# Patient Record
Sex: FEMALE | Race: BLACK OR AFRICAN AMERICAN | Employment: FULL TIME | URBAN - METROPOLITAN AREA
[De-identification: names, ages, dates, MRNs, and addresses within clinical notes are randomized per-mention and may not be internally consistent; named-entity substitution may affect disease eponyms.]

---

## 2020-05-19 ENCOUNTER — HOSPITAL ENCOUNTER (EMERGENCY)
Age: 25
Discharge: HOME | End: 2020-05-19
Attending: EMERGENCY MEDICINE
Payer: COMMERCIAL

## 2020-05-19 VITALS
SYSTOLIC BLOOD PRESSURE: 101 MMHG | OXYGEN SATURATION: 100 % | WEIGHT: 140 LBS | TEMPERATURE: 98.9 F | HEIGHT: 68 IN | DIASTOLIC BLOOD PRESSURE: 57 MMHG | HEART RATE: 58 BPM | RESPIRATION RATE: 17 BRPM | BODY MASS INDEX: 21.22 KG/M2

## 2020-05-19 LAB
ANION GAP SERPL CALCULATED.3IONS-SCNC: 9 MMOL/L (ref 3–16)
BACTERIA: ABNORMAL /HPF
BILIRUBIN URINE: NEGATIVE
BLOOD, URINE: NEGATIVE
BUN BLDV-MCNC: 12 MG/DL (ref 7–20)
CALCIUM SERPL-MCNC: 9.7 MG/DL (ref 8.3–10.6)
CHLORIDE BLD-SCNC: 101 MMOL/L (ref 99–110)
CLARITY: CLEAR
CO2: 29 MMOL/L (ref 21–32)
COLOR: YELLOW
CREAT SERPL-MCNC: 0.8 MG/DL (ref 0.6–1.1)
EKG ATRIAL RATE: 66 BPM
EKG DIAGNOSIS: NORMAL
EKG P AXIS: 75 DEGREES
EKG P-R INTERVAL: 156 MS
EKG Q-T INTERVAL: 388 MS
EKG QRS DURATION: 78 MS
EKG QTC CALCULATION (BAZETT): 406 MS
EKG R AXIS: 69 DEGREES
EKG T AXIS: 36 DEGREES
EKG VENTRICULAR RATE: 66 BPM
EPITHELIAL CELLS, UA: ABNORMAL /HPF (ref 0–5)
GFR AFRICAN AMERICAN: >60
GFR NON-AFRICAN AMERICAN: >60
GLUCOSE BLD-MCNC: 91 MG/DL
GLUCOSE BLD-MCNC: 91 MG/DL (ref 70–99)
GLUCOSE BLD-MCNC: 94 MG/DL (ref 70–99)
GLUCOSE URINE: NEGATIVE MG/DL
HCG(URINE) PREGNANCY TEST: NEGATIVE
KETONES, URINE: NEGATIVE MG/DL
LEUKOCYTE ESTERASE, URINE: ABNORMAL
MAGNESIUM: 1.9 MG/DL (ref 1.8–2.4)
MICROSCOPIC EXAMINATION: YES
NITRITE, URINE: NEGATIVE
PERFORMED ON: NORMAL
PH UA: 7 (ref 5–8)
POTASSIUM REFLEX MAGNESIUM: 3.5 MMOL/L (ref 3.5–5.1)
PROTEIN UA: NEGATIVE MG/DL
RBC UA: ABNORMAL /HPF (ref 0–4)
SODIUM BLD-SCNC: 139 MMOL/L (ref 136–145)
SPECIFIC GRAVITY UA: 1.02 (ref 1–1.03)
URINE TYPE: ABNORMAL
UROBILINOGEN, URINE: 1 E.U./DL
WBC UA: ABNORMAL /HPF (ref 0–5)

## 2020-05-19 PROCEDURE — 80048 BASIC METABOLIC PNL TOTAL CA: CPT

## 2020-05-19 PROCEDURE — 83735 ASSAY OF MAGNESIUM: CPT

## 2020-05-19 PROCEDURE — 93005 ELECTROCARDIOGRAM TRACING: CPT | Performed by: EMERGENCY MEDICINE

## 2020-05-19 PROCEDURE — 84703 CHORIONIC GONADOTROPIN ASSAY: CPT

## 2020-05-19 PROCEDURE — 99284 EMERGENCY DEPT VISIT MOD MDM: CPT

## 2020-05-19 PROCEDURE — 81001 URINALYSIS AUTO W/SCOPE: CPT

## 2020-05-19 ASSESSMENT — PAIN DESCRIPTION - LOCATION: LOCATION: ABDOMEN

## 2020-05-19 ASSESSMENT — PAIN DESCRIPTION - DESCRIPTORS: DESCRIPTORS: ACHING

## 2020-05-19 ASSESSMENT — PAIN DESCRIPTION - ORIENTATION: ORIENTATION: LEFT

## 2020-05-19 ASSESSMENT — PAIN DESCRIPTION - PROGRESSION: CLINICAL_PROGRESSION: GRADUALLY WORSENING

## 2020-05-19 ASSESSMENT — PAIN SCALES - GENERAL: PAINLEVEL_OUTOF10: 6

## 2020-05-19 ASSESSMENT — PAIN DESCRIPTION - ONSET: ONSET: ON-GOING

## 2020-05-19 ASSESSMENT — PAIN DESCRIPTION - PAIN TYPE: TYPE: ACUTE PAIN

## 2020-05-19 ASSESSMENT — PAIN DESCRIPTION - FREQUENCY: FREQUENCY: INTERMITTENT

## 2020-05-19 NOTE — ED NOTES
Pt DC from ED ambulatory. Reviewed discharge instructions. All questions were answered to patient satisfaction.  ERMIAS Barrow RN  05/19/20 [id number]

## 2020-05-19 NOTE — ED PROVIDER NOTES
[address] [facility] RESIDENT NOTE       Date of evaluation: 5/19/2020    Chief Complaint     Loss of Consciousness (passed out in the bathroom this morning. Hit her head and left shoulder) and Nausea      History of Present Illness     Hoa Sabillon is a 23 y.o. female who presents to the emergency department after a syncopal episode at home. She was brushing her teeth in the bathroom when she stood up quickly, experienced tunnel vision, flushing, nausea, and then passed out onto the tile floor. She believes that she hit her head in the left side of her body against the floor as she is now experiencing pain in those areas. She denies preceding chest pain, palpitations, shortness of breath. She also denies having bitten her tongue or having lost control of her bladder. Over the course of the last several days the patient has had an intermittent headache which she describes as similar to previous headaches that she has had from time to time but says that she has been staying adequately hydrated and has no changes in her appetite or diet. Review of Systems     Review of Systems    10-point review of systems completed and negative other than those items listed in the HPI above. Past Medical, Surgical, Family, and Social History     She has a past medical history of Anemia and Headache(784.0). She has a past surgical history that includes Tonsillectomy. Her family history is not on file. She reports that she has never smoked. She has never used smokeless tobacco. She reports that she does not drink alcohol or use drugs.     Medications     Discharge Medication List as of 5/19/2020  4:58 PM      CONTINUE these medications which have NOT CHANGED    Details   Prenatal MV-Min-Fe Fum-FA-DHA (PRENATAL 1 PO) Take by mouth dailyHistorical Med      acetaminophen (APAP EXTRA STRENGTH) 500 MG tablet Take 1 tablet by mouth every 6 hours as needed for Pain, Disp-120 tablet, inversion in lead III and lead V1. No ST segment changes. No evidence of acute ischemia, dysrhythmia, or strain. No prior EKGs available for comparison.     RADIOLOGY:  No orders to display       LABS:   Results for orders placed or performed during the hospital encounter of 05/19/20   Pregnancy, Urine   Result Value Ref Range    HCG(Urine) Pregnancy Test Negative Detects HCG level >20 MIU/mL   Urinalysis, reflex to microscopic   Result Value Ref Range    Color, UA Yellow Straw/Yellow    Clarity, UA Clear Clear    Glucose, Ur Negative Negative mg/dL    Bilirubin Urine Negative Negative    Ketones, Urine Negative Negative mg/dL    Specific Gravity, UA 1.020 1.005 - 1.030    Blood, Urine Negative Negative    pH, UA 7.0 5.0 - 8.0    Protein, UA Negative Negative mg/dL    Urobilinogen, Urine 1.0 <2.0 E.U./dL    Nitrite, Urine Negative Negative    Leukocyte Esterase, Urine TRACE (A) Negative    Microscopic Examination YES     Urine Type NotGiven    Basic Metabolic Panel w/ Reflex to MG   Result Value Ref Range    Sodium 139 136 - 145 mmol/L    Potassium reflex Magnesium 3.5 3.5 - 5.1 mmol/L    Chloride 101 99 - 110 mmol/L    CO2 29 21 - 32 mmol/L    Anion Gap 9 3 - 16    Glucose 94 70 - 99 mg/dL    BUN 12 7 - 20 mg/dL    CREATININE 0.8 0.6 - 1.1 mg/dL    GFR Non-African American >60 >60    GFR African American >60 >60    Calcium 9.7 8.3 - 10.6 mg/dL   Microscopic Urinalysis   Result Value Ref Range    WBC, UA 3-5 0 - 5 /HPF    RBC, UA None seen 0 - 4 /HPF    Epithelial Cells, UA 0-1 0 - 5 /HPF    Bacteria, UA 1+ (A) None Seen /HPF   Magnesium   Result Value Ref Range    Magnesium 1.90 1.80 - 2.40 mg/dL   POC Glucose   Result Value Ref Range    Glucose 91 mg/dL   POCT Glucose   Result Value Ref Range    POC Glucose 91 70 - 99 mg/dl    Performed on [employer]    EKG 12 Lead   Result Value Ref Range    Ventricular Rate 66 BPM    Atrial Rate 66 BPM    P-R Interval 156 ms    QRS Duration 78 ms    Q-T Interval 388 ms    QTc Calculation (Bazett) 406 ms    P Axis 75 degrees    R Axis 69 degrees    T Axis 36 degrees    Diagnosis       EKG performed in ER and to be interpreted by ER physician. Confirmed by MD, ER ([id number]),  Edmundo Fish ([phone]) on 5/19/2020 5:36:22 PM       ED BEDSIDE ULTRASOUND:  None    RECENT VITALS:  BP: (!) 101/57, Temp: 98.9 °F (37.2 °C), Pulse: 58,Resp: 17, SpO2: 100 %     Procedures     None    ED Course     Nursing Notes, Past Medical Hx, Past Surgical Hx, Social Hx, Allergies, and Family Hx were reviewed. The patient was given the followingmedications:  No orders of the defined types were placed in this encounter. CONSULTS:  None    MEDICAL DECISION MAKING / ASSESSMENT / PLAN     Hoa Sabillon is a 23 y.o. female who is otherwise previously healthy presenting after an episode of syncope at home. Based on our evaluation, this is a patient that is presenting with low risk syncope. High risk factors such as prior coronary artery disease, congestive heart failure, advanced age, chest pain or palpitations are all absent. There was no history or exam findings to suggest a neurologic cause of syncope. Additionally, electrolytes, kidney function, and CBC were within normal limits and the patient had no historical points concerning for GI bleeding. Pregnancy test was negative. No fever or nidus of infection apparent on exam as well. EKG was within normal limits. PE was considered but is less likely given the clinical picture and current vital signs. There is also no evidence of serious trauma resulting from the fall that would requiring further workup. The patient's story correlates best with orthostasis / vasovagal syncope, and given the low risk nature of the presentation, patient will be discharged home with appropriate follow up. This patient was also evaluated by the attending physician. All care plans werediscussed and agreed upon. Clinical Impression     1.  Syncope and collapse

## 2020-06-22 ENCOUNTER — OFFICE VISIT (OUTPATIENT)
Dept: PRIMARY CARE CLINIC | Age: 25
End: 2020-06-22
Payer: COMMERCIAL

## 2020-06-22 VITALS — TEMPERATURE: 97.7 F

## 2020-06-22 PROCEDURE — G8428 CUR MEDS NOT DOCUMENT: HCPCS | Performed by: INTERNAL MEDICINE

## 2020-06-22 PROCEDURE — 1036F TOBACCO NON-USER: CPT | Performed by: INTERNAL MEDICINE

## 2020-06-22 PROCEDURE — 99211 OFF/OP EST MAY X REQ PHY/QHP: CPT | Performed by: INTERNAL MEDICINE

## 2020-06-22 PROCEDURE — G8420 CALC BMI NORM PARAMETERS: HCPCS | Performed by: INTERNAL MEDICINE

## 2020-06-22 NOTE — PATIENT INSTRUCTIONS
have a medical emergency and need to call 911, notify the dispatch personnel that you have, or are being evaluated for COVID-19. If possible, put on a facemask before emergency medical services arrive. Discontinuing home isolation  Patients with confirmed COVID-19 should remain under home isolation precautions until the risk of secondary transmission to others is thought to be low. The decision to discontinue home isolation precautions should be made on a case-by-case basis, in consultation with healthcare providers and state and local health departments. Thank you for enrolling in [address] Please follow the instructions below to securely access your online medical record. [employer] allows you to send messages to your doctor, view your test results, renew your prescriptions, schedule appointments, and more. How Do I Sign Up? 1. In your Internet browser, go to [email or web address]  2. Click on the Sign Up Now link in the Sign In box. You will see the New Member Sign Up page. 3. Enter your [employer] Access Code exactly as it appears below. You will not need to use this code after youve completed the sign-up process. If you do not sign up before the expiration date, you must request a new code. [employer] Access Code: [email or web address]  Expires: 8/6/2020  9:57 AM    4. Enter your Social Security Number ([id number]) and Date of Birth (mm/dd/yyyy) as indicated and click Submit. You will be taken to the next sign-up page. 5. Create a [facility] ID. This will be your [facility] login ID and cannot be changed, so think of one that is secure and easy to remember. 6. Create a [employer] password. You can change your password at any time. 7. Enter your Password Reset Question and Answer. This can be used at a later time if you forget your password. 8. Enter your e-mail address. You will receive e-mail notification when new information is available in [address] 9. Click Sign Up.  You can now view your

## 2020-06-23 LAB
SARS-COV-2: NOT DETECTED
SOURCE: NORMAL

## 2020-06-23 NOTE — RESULT ENCOUNTER NOTE
Please contact patient with their testing results: Your test for COVID-19, also known as novel coronavirus, came back negative. No virus was detected from the sample collected. Until your symptoms are fully resolved, you may still be contagious. We recommend that you remain isolated for 7 days minimum or 72 hours after your symptoms have completely resolved, whichever is longer. Continually monitor symptoms. Contact a medical provider if symptoms are worsening. If you have any additional questions, contact your PCP.     For additional information, please visit the Centers for Disease Control and Prevention   [email or web address]

## 2021-03-13 ENCOUNTER — HOSPITAL ENCOUNTER (EMERGENCY)
Age: 26
Discharge: HOME | End: 2021-03-13
Attending: EMERGENCY MEDICINE
Payer: COMMERCIAL

## 2021-03-13 VITALS
RESPIRATION RATE: 18 BRPM | BODY MASS INDEX: 22.73 KG/M2 | HEART RATE: 67 BPM | TEMPERATURE: 99.1 F | OXYGEN SATURATION: 99 % | WEIGHT: 150 LBS | DIASTOLIC BLOOD PRESSURE: 87 MMHG | HEIGHT: 68 IN | SYSTOLIC BLOOD PRESSURE: 133 MMHG

## 2021-03-13 DIAGNOSIS — N93.9 VAGINAL BLEEDING: Primary | ICD-10-CM

## 2021-03-13 LAB
ABO/RH: NORMAL
ANION GAP SERPL CALCULATED.3IONS-SCNC: 6 MMOL/L (ref 3–16)
ANTIBODY SCREEN: NORMAL
BACTERIA: ABNORMAL /HPF
BASOPHILS ABSOLUTE: 0 K/UL (ref 0–0.2)
BASOPHILS RELATIVE PERCENT: 0.3 %
BILIRUBIN URINE: NEGATIVE
BLOOD, URINE: ABNORMAL
BUN BLDV-MCNC: 9 MG/DL (ref 7–20)
CALCIUM SERPL-MCNC: 9.2 MG/DL (ref 8.3–10.6)
CHLORIDE BLD-SCNC: 104 MMOL/L (ref 99–110)
CLARITY: CLEAR
CO2: 28 MMOL/L (ref 21–32)
COLOR: YELLOW
CREAT SERPL-MCNC: 0.8 MG/DL (ref 0.6–1.1)
EOSINOPHILS ABSOLUTE: 0.1 K/UL (ref 0–0.6)
EOSINOPHILS RELATIVE PERCENT: 1.6 %
EPITHELIAL CELLS, UA: ABNORMAL /HPF (ref 0–5)
GFR AFRICAN AMERICAN: >60
GFR NON-AFRICAN AMERICAN: >60
GLUCOSE BLD-MCNC: 67 MG/DL (ref 70–99)
GLUCOSE URINE: NEGATIVE MG/DL
GONADOTROPIN, CHORIONIC (HCG) QUANT: 332.1 MIU/ML
HCT VFR BLD CALC: 36 % (ref 36–48)
HEMOGLOBIN: 11.6 G/DL (ref 12–16)
KETONES, URINE: NEGATIVE MG/DL
LEUKOCYTE ESTERASE, URINE: NEGATIVE
LYMPHOCYTES ABSOLUTE: 2.1 K/UL (ref 1–5.1)
LYMPHOCYTES RELATIVE PERCENT: 43 %
MCH RBC QN AUTO: 29 PG (ref 26–34)
MCHC RBC AUTO-ENTMCNC: 32.1 G/DL (ref 31–36)
MCV RBC AUTO: 90.2 FL (ref 80–100)
MICROSCOPIC EXAMINATION: YES
MONOCYTES ABSOLUTE: 0.3 K/UL (ref 0–1.3)
MONOCYTES RELATIVE PERCENT: 6.3 %
NEUTROPHILS ABSOLUTE: 2.4 K/UL (ref 1.7–7.7)
NEUTROPHILS RELATIVE PERCENT: 48.8 %
NITRITE, URINE: NEGATIVE
PDW BLD-RTO: 13.4 % (ref 12.4–15.4)
PH UA: 6.5 (ref 5–8)
PLATELET # BLD: 186 K/UL (ref 135–450)
PMV BLD AUTO: 9.2 FL (ref 5–10.5)
POTASSIUM REFLEX MAGNESIUM: 4 MMOL/L (ref 3.5–5.1)
PROTEIN UA: NEGATIVE MG/DL
RBC # BLD: 3.99 M/UL (ref 4–5.2)
RBC UA: ABNORMAL /HPF (ref 0–4)
SODIUM BLD-SCNC: 138 MMOL/L (ref 136–145)
SPECIFIC GRAVITY UA: 1.02 (ref 1–1.03)
URINE REFLEX TO CULTURE: ABNORMAL
URINE TYPE: ABNORMAL
UROBILINOGEN, URINE: 0.2 E.U./DL
WBC # BLD: 4.8 K/UL (ref 4–11)
WBC UA: ABNORMAL /HPF (ref 0–5)

## 2021-03-13 PROCEDURE — 99203 OFFICE O/P NEW LOW 30 MIN: CPT | Performed by: OBSTETRICS & GYNECOLOGY

## 2021-03-13 PROCEDURE — 80048 BASIC METABOLIC PNL TOTAL CA: CPT

## 2021-03-13 PROCEDURE — 86901 BLOOD TYPING SEROLOGIC RH(D): CPT

## 2021-03-13 PROCEDURE — 96372 THER/PROPH/DIAG INJ SC/IM: CPT

## 2021-03-13 PROCEDURE — 99285 EMERGENCY DEPT VISIT HI MDM: CPT

## 2021-03-13 PROCEDURE — 36415 COLL VENOUS BLD VENIPUNCTURE: CPT

## 2021-03-13 PROCEDURE — 6360000002 HC RX W HCPCS: Performed by: STUDENT IN AN ORGANIZED HEALTH CARE EDUCATION/TRAINING PROGRAM

## 2021-03-13 PROCEDURE — 2580000003 HC RX 258: Performed by: STUDENT IN AN ORGANIZED HEALTH CARE EDUCATION/TRAINING PROGRAM

## 2021-03-13 PROCEDURE — 86900 BLOOD TYPING SEROLOGIC ABO: CPT

## 2021-03-13 PROCEDURE — 85025 COMPLETE CBC W/AUTO DIFF WBC: CPT

## 2021-03-13 PROCEDURE — 86850 RBC ANTIBODY SCREEN: CPT

## 2021-03-13 PROCEDURE — 96365 THER/PROPH/DIAG IV INF INIT: CPT

## 2021-03-13 PROCEDURE — 84702 CHORIONIC GONADOTROPIN TEST: CPT

## 2021-03-13 PROCEDURE — 6370000000 HC RX 637 (ALT 250 FOR IP): Performed by: STUDENT IN AN ORGANIZED HEALTH CARE EDUCATION/TRAINING PROGRAM

## 2021-03-13 PROCEDURE — 81001 URINALYSIS AUTO W/SCOPE: CPT

## 2021-03-13 PROCEDURE — 96366 THER/PROPH/DIAG IV INF ADDON: CPT

## 2021-03-13 RX ORDER — METHYLERGONOVINE MALEATE 0.2 MG/ML
200 INJECTION INTRAVENOUS ONCE
Status: COMPLETED | OUTPATIENT
Start: 2021-03-13 | End: 2021-03-13

## 2021-03-13 RX ORDER — HYDROCODONE BITARTRATE AND ACETAMINOPHEN 5; 325 MG/1; MG/1
1 TABLET ORAL EVERY 6 HOURS PRN
Qty: 5 TABLET | Refills: 0 | Status: SHIPPED | OUTPATIENT
Start: 2021-03-13 | End: 2021-03-16

## 2021-03-13 RX ORDER — ACETAMINOPHEN 325 MG/1
650 TABLET ORAL ONCE
Status: COMPLETED | OUTPATIENT
Start: 2021-03-13 | End: 2021-03-13

## 2021-03-13 RX ORDER — METHYLERGONOVINE MALEATE 0.2 MG/1
0.2 TABLET ORAL EVERY 6 HOURS
Qty: 8 TABLET | Refills: 0 | Status: SHIPPED | OUTPATIENT
Start: 2021-03-13 | End: 2021-03-15

## 2021-03-13 RX ORDER — DOXYCYCLINE 100 MG/1
100 TABLET ORAL 2 TIMES DAILY
Qty: 14 TABLET | Refills: 0 | Status: SHIPPED | OUTPATIENT
Start: 2021-03-13 | End: 2021-03-20

## 2021-03-13 RX ORDER — IBUPROFEN 600 MG/1
600 TABLET ORAL EVERY 6 HOURS
Qty: 30 TABLET | Refills: 0 | Status: SHIPPED | OUTPATIENT
Start: 2021-03-13

## 2021-03-13 RX ADMIN — ACETAMINOPHEN 650 MG: 325 TABLET ORAL at 11:13

## 2021-03-13 RX ADMIN — OXYTOCIN: 10 INJECTION, SOLUTION INTRAMUSCULAR; INTRAVENOUS at 11:52

## 2021-03-13 RX ADMIN — METHYLERGONOVINE MALEATE 200 MCG: 0.2 INJECTION INTRAVENOUS at 11:47

## 2021-03-13 ASSESSMENT — ENCOUNTER SYMPTOMS
NAUSEA: 0
VOMITING: 0
SORE THROAT: 0
ABDOMINAL PAIN: 1
SHORTNESS OF BREATH: 0

## 2021-03-13 ASSESSMENT — PAIN DESCRIPTION - ORIENTATION: ORIENTATION: LOWER

## 2021-03-13 ASSESSMENT — PAIN SCALES - GENERAL
PAINLEVEL_OUTOF10: 8
PAINLEVEL_OUTOF10: 8

## 2021-03-13 ASSESSMENT — PAIN DESCRIPTION - PAIN TYPE: TYPE: ACUTE PAIN

## 2021-03-13 ASSESSMENT — PAIN DESCRIPTION - DESCRIPTORS: DESCRIPTORS: CRAMPING

## 2021-03-13 ASSESSMENT — PAIN DESCRIPTION - LOCATION: LOCATION: ABDOMEN

## 2021-03-13 NOTE — ED TRIAGE NOTES
Had elective  on Thursday at 26 weeks.  States has been passing large blood clots (the size of my hand) since that time with associated abdominal pain

## 2021-03-13 NOTE — ED PROVIDER NOTES
[address] [facility] RESIDENT NOTE       Date of evaluation: 3/13/2021    Chief Complaint     Vaginal Bleeding (Had elective  on Thursday at 29 weeks. States has been passing large blood clots (the size of my hand) since that time with associated abdominal pain)      History of Present Illness     Dallas Moy is a 23 y.o. female [id number] who presents with vaginal bleeding. Patient had termination of her pregnancy 2 days ago at [address] [place] [address]. She had minimal vaginal bleeding for the first 24 hours, but starting yesterday began passing large clots. Now, she is soaking through 3 pads an hour along with her large clots. She has back pain and lower abdominal pain, cramping and sharp, without radiation. This has been progressively worsening over the past 24 hours as well. Review of Systems     Review of Systems   Constitutional: Negative for fever. HENT: Negative for congestion and sore throat. Respiratory: Negative for shortness of breath. Cardiovascular: Negative for chest pain. Gastrointestinal: Positive for abdominal pain. Negative for nausea and vomiting. Genitourinary: Positive for pelvic pain and vaginal bleeding. Negative for difficulty urinating and vaginal discharge. Neurological: Negative for dizziness and weakness. Hematological: Does not bruise/bleed easily. Psychiatric/Behavioral: Negative for confusion. All other systems reviewed and are negative. Past Medical, Surgical, Family, and Social History     She has a past medical history of Anemia and Headache(784.0). She has a past surgical history that includes Tonsillectomy and Induced  (2021). Her family history is not on file. She reports that she has never smoked. She has never used smokeless tobacco. She reports that she does not drink alcohol or use drugs.     Medications     Previous Medications    No medications on file       Allergies     She has No Known Allergies. Physical Exam     INITIAL VITALS: BP: 133/87, Temp: 99.1 °F (37.3 °C), Pulse: 67, Resp: 18, SpO2: 99 %   Physical Exam  Exam conducted with a chaperone present. Constitutional:       General: She is not in acute distress. HENT:      Head: Normocephalic and atraumatic. Right Ear: External ear normal.      Left Ear: External ear normal.      Nose: Nose normal.      Mouth/Throat:      Mouth: Mucous membranes are moist.      Pharynx: Oropharynx is clear. Eyes:      Extraocular Movements: Extraocular movements intact. Conjunctiva/sclera: Conjunctivae normal.   Neck:      Musculoskeletal: Normal range of motion and neck supple. Cardiovascular:      Rate and Rhythm: Normal rate and regular rhythm. Pulses: Normal pulses. Heart sounds: Normal heart sounds. No murmur. Pulmonary:      Effort: Pulmonary effort is normal. No respiratory distress. Breath sounds: Normal breath sounds. Abdominal:      General: Abdomen is flat. There is no distension. Palpations: Abdomen is soft. Tenderness: There is no abdominal tenderness. There is no right CVA tenderness or left CVA tenderness. Genitourinary:     General: Normal vulva. Exam position: Lithotomy position. Vagina: Bleeding present. No tenderness or lesions. Cervix: Dilated. Cervical bleeding present. No cervical motion tenderness, discharge, lesion or erythema. Comments: Clitoral piercing in place. Musculoskeletal:         General: No deformity. Skin:     General: Skin is warm and dry. Capillary Refill: Capillary refill takes less than 2 seconds. Findings: No bruising. Neurological:      General: No focal deficit present. Mental Status: She is alert and oriented to person, place, and time.    Psychiatric:         Mood and Affect: Mood normal.         Behavior: Behavior normal.         DiagnosticResults     EKG   None    RADIOLOGY:  No orders to display       LABS:   Results for orders placed or performed during the hospital encounter of 03/13/21   Urinalysis Reflex to Culture    Specimen: Urine, clean catch   Result Value Ref Range    Color, UA Yellow Straw/Yellow    Clarity, UA Clear Clear    Glucose, Ur Negative Negative mg/dL    Bilirubin Urine Negative Negative    Ketones, Urine Negative Negative mg/dL    Specific Gravity, UA 1.020 1.005 - 1.030    Blood, Urine MODERATE (A) Negative    pH, UA 6.5 5.0 - 8.0    Protein, UA Negative Negative mg/dL    Urobilinogen, Urine 0.2 <2.0 E.U./dL    Nitrite, Urine Negative Negative    Leukocyte Esterase, Urine Negative Negative    Microscopic Examination YES     Urine Type NotGiven     Urine Reflex to Culture Not Indicated    Basic Metabolic Panel w/ Reflex to MG   Result Value Ref Range    Sodium 138 136 - 145 mmol/L    Potassium reflex Magnesium 4.0 3.5 - 5.1 mmol/L    Chloride 104 99 - 110 mmol/L    CO2 28 21 - 32 mmol/L    Anion Gap 6 3 - 16    Glucose 67 (L) 70 - 99 mg/dL    BUN 9 7 - 20 mg/dL    CREATININE 0.8 0.6 - 1.1 mg/dL    GFR Non-African American >60 >60    GFR African American >60 >60    Calcium 9.2 8.3 - 10.6 mg/dL   CBC Auto Differential   Result Value Ref Range    WBC 4.8 4.0 - 11.0 K/uL    RBC 3.99 (L) 4.00 - 5.20 M/uL    Hemoglobin 11.6 (L) 12.0 - 16.0 g/dL    Hematocrit 36.0 36.0 - 48.0 %    MCV 90.2 80.0 - 100.0 fL    MCH 29.0 26.0 - 34.0 pg    MCHC 32.1 31.0 - 36.0 g/dL    RDW 13.4 12.4 - 15.4 %    Platelets 422 931 - 055 K/uL    MPV 9.2 5.0 - 10.5 fL    Neutrophils % 48.8 %    Lymphocytes % 43.0 %    Monocytes % 6.3 %    Eosinophils % 1.6 %    Basophils % 0.3 %    Neutrophils Absolute 2.4 1.7 - 7.7 K/uL    Lymphocytes Absolute 2.1 1.0 - 5.1 K/uL    Monocytes Absolute 0.3 0.0 - 1.3 K/uL    Eosinophils Absolute 0.1 0.0 - 0.6 K/uL    Basophils Absolute 0.0 0.0 - 0.2 K/uL   HCG, Quantitative, Pregnancy   Result Value Ref Range    hCG Quant 332.1 <5.0 mIU/mL   Microscopic Urinalysis   Result Value Ref Range    WBC, UA 0-2 0 - 5 /HPF    RBC, UA 5-10 (A) 0 - 4 /HPF    Epithelial Cells, UA 11-20 (A) 0 - 5 /HPF    Bacteria, UA Rare (A) None Seen /HPF   TYPE AND SCREEN   Result Value Ref Range    ABO/Rh B POS     Antibody Screen NEG        ED BEDSIDE ULTRASOUND:  FAST exam - no free fluid    RECENT VITALS:  BP: 133/87, Temp: 99.1 °F (37.3 °C), Pulse: 67,Resp: 18, SpO2: 99 %     Procedures     None    ED Course     Nursing Notes, Past Medical Hx, Past Surgical Hx, Social Hx, Allergies, and Family Hx were reviewed. The patient was given the followingmedications:  Orders Placed This Encounter   Medications    acetaminophen (TYLENOL) tablet 650 mg    methylergonovine (METHERGINE) injection 200 mcg    oxytocin 40 Units in sodium chloride 0.9 % 1,000 mL infusion    methylergonovine (METHERGINE) 0.2 MG tablet     Sig: Take 1 tablet by mouth every 6 hours for 2 days     Dispense:  8 tablet     Refill:  0    doxycycline monohydrate (ADOXA) 100 MG tablet     Sig: Take 1 tablet by mouth 2 times daily for 7 days     Dispense:  14 tablet     Refill:  0    ibuprofen (ADVIL;MOTRIN) 600 MG tablet     Sig: Take 1 tablet by mouth every 6 hours     Dispense:  30 tablet     Refill:  0    HYDROcodone-acetaminophen (NORCO) 5-325 MG per tablet     Sig: Take 1 tablet by mouth every 6 hours as needed for Pain for up to 3 days. Intended supply: 3 days. Take lowest dose possible to manage pain     Dispense:  5 tablet     Refill:  0       CONSULTS:  IP CONSULT TO OB GYN    MEDICAL DECISION MAKING / ASSESSMENT / Danyelle Ru is a 23 y.o. female presenting for vaginal bleeding after termination of pregnancy 2 days ago. Physical exam is concerning for uterine atony due to boggy uterus. Fast exam shows no free fluid. CBC shows hemoglobin 11.6, slightly down from baseline. BMP unremarkable. UA shows RBCs, with contamination. Type and screen sent. Patient is Rh+. Quantitative hCG 332. GYN consulted for concerns of uterine atony.   Recommend

## 2021-03-13 NOTE — PROGRESS NOTES
Patient is a 23year old F s/p suction d and e at 32 weeks in [address] [place] [address] overnight after procedure in hotel next door. Patient started bleeding this am heavier. Patient no fever, chills, does have some cramping. Vaginal Bleeding  This is a new problem. The current episode started yesterday. The problem occurs daily. The problem has been unchanged. Associated symptoms include abdominal pain. Pertinent negatives include no urinary symptoms. Nothing aggravates the symptoms. She has tried nothing for the symptoms. The treatment provided no relief.      Review of Systems: as above  General ROS: negative  Psychological ROS: negative  Ophthalmic ROS: negative  ENT ROS: negative  Allergy and Immunology ROS: negative  Hematological and Lymphatic ROS: negative  Endocrine ROS: negative  Breast ROS: negative  Respiratory ROS: negative  Cardiovascular ROS: negative  Gastrointestinal ROS: negative  Genito-Urinary ROS: as above  Musculoskeletal ROS: negative  Neurological ROS: negative  Dermatological ROS: negative    Date of Birth 1995  Past Medical History:   Diagnosis Date    Anemia     Headache(784.0)      Past Surgical History:   Procedure Laterality Date    INDUCED   2021    TONSILLECTOMY       OB History    Para Term  AB Living   2       1     SAB TAB Ectopic Molar Multiple Live Births             1      # Outcome Date GA Lbr Bill/2nd Weight Sex Delivery Anes PTL Lv   2       Vag-Vacuum      1 AB              Social History     Socioeconomic History    Marital status: Single     Spouse name: Not on file    Number of children: Not on file    Years of education: Not on file    Highest education level: Not on file   Occupational History    Not on file   Social Needs    Financial resource strain: Not on file    Food insecurity     Worry: Not on file     Inability: Not on file    Transportation needs     Medical: Not on file     Non-medical: Not on file   Tobacco Use    Smoking status: Never Smoker    Smokeless tobacco: Never Used   Substance and Sexual Activity    Alcohol use: No    Drug use: No    Sexual activity: Not on file   Lifestyle    Physical activity     Days per week: Not on file     Minutes per session: Not on file    Stress: Not on file   Relationships    Social connections     Talks on phone: Not on file     Gets together: Not on file     Attends Protestant service: Not on file     Active member of club or organization: Not on file     Attends meetings of clubs or organizations: Not on file     Relationship status: Not on file    Intimate partner violence     Fear of current or ex partner: Not on file     Emotionally abused: Not on file     Physically abused: Not on file     Forced sexual activity: Not on file   Other Topics Concern    Not on file   Social History Narrative    Not on file     No Known Allergies  Outpatient Medications Marked as Taking for the 3/13/21 encounter [facility] Encounter)   Medication Sig Dispense Refill    ibuprofen (ADVIL;MOTRIN) 600 MG tablet Take 1 tablet by mouth every 6 hours as needed for Pain 30 tablet 0     History reviewed. No pertinent family history. /87   Pulse 67   Temp 99.1 °F (37.3 °C) (Oral)   Resp 18   Ht 5' 8\" (1.727 m)   Wt 150 lb (68 kg)   LMP 03/11/2021 (Exact Date)   SpO2 99%   Breastfeeding No   BMI 22.81 kg/m²     WDWN in NAD  A and O x 3  HEENT-EOMI, mmm  CAR-RRR  Lungs-CTA  ABD-soft, NT, ND, no hsm  EXT-no c/c/e, no cords, NT  Neuro-grossly intact  PV-nl efg, Normal urethral meatus, nl urethra, nl bladder. ,cervix with no active bleeding. Uterus slightly enlarged 12 week size, cervix cl/th/high    Lab Results   Component Value Date    WBC 4.8 03/13/2021    HGB 11.6 (L) 03/13/2021    HCT 36.0 03/13/2021    MCV 90.2 03/13/2021     03/13/2021     Quant 331   RH pos    Plan-patient is s/p suction d and e at 26 weeks  1-uterine atony post procedure.  Was given Methergine and IV pit in ER. Bleeding improved. To go home on methergine, abx and pain medication. Pelvic rest for 4 weeks. Follow up with her OB/GYN at [facility]. Doubt retained products given lower quant.  Will have some bleeding for up to 4-6 weeks given gestational age at time of D and E.

## 2022-10-12 ENCOUNTER — HOSPITAL ENCOUNTER (EMERGENCY)
Age: 27
Discharge: HOME | End: 2022-10-12
Payer: COMMERCIAL

## 2022-10-12 VITALS
SYSTOLIC BLOOD PRESSURE: 91 MMHG | OXYGEN SATURATION: 98 % | HEART RATE: 70 BPM | RESPIRATION RATE: 16 BRPM | DIASTOLIC BLOOD PRESSURE: 55 MMHG | TEMPERATURE: 98.1 F | WEIGHT: 165.79 LBS | HEIGHT: 68 IN | BODY MASS INDEX: 25.13 KG/M2

## 2022-10-12 DIAGNOSIS — S09.90XA INJURY OF HEAD, INITIAL ENCOUNTER: Primary | ICD-10-CM

## 2022-10-12 PROCEDURE — 99284 EMERGENCY DEPT VISIT MOD MDM: CPT

## 2022-10-12 PROCEDURE — 2580000003 HC RX 258: Performed by: PHYSICIAN ASSISTANT

## 2022-10-12 PROCEDURE — 96374 THER/PROPH/DIAG INJ IV PUSH: CPT

## 2022-10-12 PROCEDURE — 6360000002 HC RX W HCPCS: Performed by: PHYSICIAN ASSISTANT

## 2022-10-12 PROCEDURE — 96375 TX/PRO/DX INJ NEW DRUG ADDON: CPT

## 2022-10-12 RX ORDER — KETOROLAC TROMETHAMINE 30 MG/ML
15 INJECTION, SOLUTION INTRAMUSCULAR; INTRAVENOUS ONCE
Status: COMPLETED | OUTPATIENT
Start: 2022-10-12 | End: 2022-10-12

## 2022-10-12 RX ORDER — SODIUM CHLORIDE, SODIUM LACTATE, POTASSIUM CHLORIDE, AND CALCIUM CHLORIDE .6; .31; .03; .02 G/100ML; G/100ML; G/100ML; G/100ML
1000 INJECTION, SOLUTION INTRAVENOUS ONCE
Status: COMPLETED | OUTPATIENT
Start: 2022-10-12 | End: 2022-10-12

## 2022-10-12 RX ORDER — METOCLOPRAMIDE HYDROCHLORIDE 5 MG/ML
10 INJECTION INTRAMUSCULAR; INTRAVENOUS ONCE
Status: COMPLETED | OUTPATIENT
Start: 2022-10-12 | End: 2022-10-12

## 2022-10-12 RX ORDER — DIPHENHYDRAMINE HYDROCHLORIDE 50 MG/ML
25 INJECTION INTRAMUSCULAR; INTRAVENOUS ONCE
Status: COMPLETED | OUTPATIENT
Start: 2022-10-12 | End: 2022-10-12

## 2022-10-12 RX ADMIN — SODIUM CHLORIDE, POTASSIUM CHLORIDE, SODIUM LACTATE AND CALCIUM CHLORIDE 1000 ML: 600; 310; 30; 20 INJECTION, SOLUTION INTRAVENOUS at 18:56

## 2022-10-12 RX ADMIN — KETOROLAC TROMETHAMINE 15 MG: 30 INJECTION, SOLUTION INTRAMUSCULAR at 18:55

## 2022-10-12 RX ADMIN — METOCLOPRAMIDE HYDROCHLORIDE 10 MG: 5 INJECTION INTRAMUSCULAR; INTRAVENOUS at 18:55

## 2022-10-12 RX ADMIN — DIPHENHYDRAMINE HYDROCHLORIDE 25 MG: 50 INJECTION, SOLUTION INTRAMUSCULAR; INTRAVENOUS at 18:55

## 2022-10-12 ASSESSMENT — ENCOUNTER SYMPTOMS
ABDOMINAL PAIN: 0
BACK PAIN: 0
SHORTNESS OF BREATH: 0

## 2022-10-12 ASSESSMENT — PAIN SCALES - GENERAL
PAINLEVEL_OUTOF10: 7
PAINLEVEL_OUTOF10: 6
PAINLEVEL_OUTOF10: 10

## 2022-10-12 ASSESSMENT — PAIN DESCRIPTION - PAIN TYPE: TYPE: ACUTE PAIN

## 2022-10-12 ASSESSMENT — PAIN DESCRIPTION - DESCRIPTORS: DESCRIPTORS: ACHING

## 2022-10-12 ASSESSMENT — PAIN DESCRIPTION - FREQUENCY: FREQUENCY: CONTINUOUS

## 2022-10-12 ASSESSMENT — PAIN - FUNCTIONAL ASSESSMENT
PAIN_FUNCTIONAL_ASSESSMENT: 0-10
PAIN_FUNCTIONAL_ASSESSMENT: 0-10

## 2022-10-12 ASSESSMENT — PAIN DESCRIPTION - LOCATION: LOCATION: HEAD

## 2022-10-12 ASSESSMENT — PAIN DESCRIPTION - ONSET: ONSET: ON-GOING

## 2022-10-12 NOTE — ED PROVIDER NOTES
[address] [facility] ENCOUNTER          PHYSICIAN ASSISTANT NOTE     Date of evaluation: 10/12/2022    Chief Complaint     Fall (Pt states she fell off of her bed two days ago. Pt c/o headache without relief from Motrin or Tylenol.) and Headache    History of Present Illness     HPI: Farzaneh Thakkar is a 24 y.o. female with no pertinent past medical history who presents to the emergency department with headache. 2 nights ago the patient rolled off of her bed, striking the back of her head on her nightstand. She did not lose consciousness. She is not on blood thinners and has not experienced any nausea, vomiting, amnesia towards the event. She presents today because she has been having dull headaches that have not improved despite taking ibuprofen. She denies any associated visual changes, focal weakness, difficulty talking or walking. She was able to go to work today though did not feel that she was as bubbly as normal.  She denies sustaining any other injuries from her fall. With the exception of the above, there are no aggravating or alleviating factors. Review of Systems     Review of Systems   Constitutional:  Negative for chills and fever. Respiratory:  Negative for shortness of breath. Cardiovascular:  Negative for chest pain. Gastrointestinal:  Negative for abdominal pain. Musculoskeletal:  Negative for back pain and neck pain. Neurological:  Positive for headaches. Negative for dizziness, syncope, speech difficulty, weakness, light-headedness and numbness. As stated above, all other systems reviewed and are otherwise negative. Past Medical, Surgical, Family, and Social History     She has a past medical history of Anemia and Headache(784.0). She has a past surgical history that includes Tonsillectomy and Induced  (2021). Her family history is not on file. She reports that she has never smoked.  She has never used smokeless tobacco. She reports that she does not drink alcohol and does not use drugs. Medications     Discharge Medication List as of 10/12/2022  8:32 PM        CONTINUE these medications which have NOT CHANGED    Details   ibuprofen (ADVIL;MOTRIN) 600 MG tablet Take 1 tablet by mouth every 6 hours, Disp-30 tablet, R-0Print             Allergies     She has No Known Allergies. Physical Exam     INITIAL VITALS: BP: 112/68, Temp: 98.1 °F (36.7 °C), Heart Rate: 96, Resp: 16, SpO2: 96 %  Physical Exam  Constitutional:       General: She is not in acute distress. Appearance: Normal appearance. She is normal weight. She is not ill-appearing, toxic-appearing or diaphoretic. HENT:      Head: Normocephalic and atraumatic. Nose: Nose normal.      Mouth/Throat:      Mouth: Mucous membranes are moist.      Pharynx: Oropharynx is clear. Eyes:      Extraocular Movements: Extraocular movements intact. Conjunctiva/sclera: Conjunctivae normal.   Cardiovascular:      Rate and Rhythm: Normal rate. Pulses: Normal pulses. Pulmonary:      Effort: Pulmonary effort is normal. No respiratory distress. Abdominal:      General: Abdomen is flat. There is no distension. Palpations: Abdomen is soft. Tenderness: There is no abdominal tenderness. There is no guarding or rebound. Musculoskeletal:         General: Normal range of motion. Cervical back: Normal range of motion. Skin:     General: Skin is warm and dry. Neurological:      General: No focal deficit present. Mental Status: She is alert and oriented to person, place, and time. Comments: Clear speech, no dysarthria or aphasia. No neglect or visual field deficits. Basic cranial nerves intact. Equal shoulder shrug bilaterally. He was strength in bilateral upper extremities. Able to raise bilateral lower extremities off the bed. Able to ambulate independently without any difficulty.    Psychiatric:         Mood and Affect: Mood normal.         Behavior: Behavior normal.     Diagnostic Results     RADIOLOGY:  No orders to display       LABS:   No results found for this visit on 10/12/22. RECENT VITALS:  BP: (!) 91/55, Temp: 98.1 °F (36.7 °C), Heart Rate: 70, Resp: 16, SpO2: 98 %     Procedures     N/a    ED Course     Nursing Notes, Past Medical Hx,Past Surgical Hx, Social Hx, Allergies, and Family Hx were reviewed. The patient was given the following medications:  Orders Placed This Encounter   Medications    lactated ringers bolus    ketorolac (TORADOL) injection 15 mg    metoclopramide (REGLAN) injection 10 mg    diphenhydrAMINE (BENADRYL) injection 25 mg       CONSULTS:  None    MEDICAL DECISION MAKING / ASSESSMENT / PLAN     Vitals:    10/12/22 1745 10/12/22 1900 10/12/22 2036   BP: 112/68 116/73 (!) 91/55   Pulse: 96 87 70   Resp: 16 16 16   Temp: 98.1 °F (36.7 °C)     TempSrc: Oral     SpO2: 96% 100% 98%   Weight: 165 lb 12.6 oz (75.2 kg)     Height: 5' 8\" (1.727 m)         Kerrie Salinas is a 24 y.o. female who presents to the emergency department with headache. 2 nights ago the patient rolled out of bed and hit her head on her nightstand. Did not lose consciousness, experience any nausea or vomiting and has not been amnestic toward the event. She has had an intermittent headache since then, denies any focal weakness, difficulty talking, walking or vision changes. She is been taking some ibuprofen with no significant improvement patient has not. The patient has remained hemodynamically stable throughout their stay in the emergency department, and appears well overall. Entirely nonfocal neurologic exam.    Given patient's lack of focal neurologic deficits after over 48 hours since her head trauma with no nausea, vomiting, amnesia towards the event or other red flag symptoms I do not feel that a CT scan is necessary of the patient's head at this time. This was explained to the patient who is agreeable with the plan for holding off.   She was agreeable to attempting a migraine cocktail, therefore received Reglan, Benadryl, Toradol and a liter of IV fluids. On reassessment patient had significant improvement of symptoms and feels appropriate going home at this time. She was instructed on concussion management and symptom control. She feels comfortable with the plan for discharge home with strict return precautions and close follow-up. Clinical Impression     1. Injury of head, initial encounter        This note was dictated using voice-recognition software, which occasionally leads to inadvertent typographic errors.     Disposition     PATIENT REFERRED TO:  [facility]  [address] [place] [address]    Schedule an appointment as soon as possible for a visit   To establish care with a primary care provider    DISCHARGE MEDICATIONS:  Discharge Medication List as of 10/12/2022  8:32 PM          DISPOSITION Decision To Discharge 10/12/2022 08:31:21 PM       Karin Anderson  10/12/22 7132

## 2022-10-12 NOTE — Clinical Note
Sandra Tsai was seen and treated in our emergency department on 10/12/2022. She may return to work on 10/14/2022. If you have any questions or concerns, please don't hesitate to call.       KRISTI Issa

## 2022-10-13 NOTE — ED NOTES
Pts BP 91/55. Notified Zeb Dsouza, [id number] for discharge.      Joe Taylor RN  10/12/22 2040

## 2022-10-13 NOTE — DISCHARGE INSTRUCTIONS
ED Course     Today, you were seen in the Emergency Department. You have been diagnosed with:   1. Injury of head, initial encounter        Disposition/Plan     IMPORTANT INSTRUCTIONS:  You may alternate Tylenol and Ibuprofen for pain coverage. Take Ibuprofen 600 mg every 6 hours for your pain. Ibuprofen: Take one 600 mg tablet every 6 hours as needed for pain and inflammation. Take this medicine with food, and be sure to drink plenty of water. Do not take this medicine continuously for longer than two weeks. Ibuprofen is a part of a class of medications called nonsteroidal anti-inflammatory drugs (NSAIDs). NSAIDs cause an increased risk of serious gastrointestinal (GI) adverse events, including bleeding, ulceration, and perforation of the stomach or intestines, which can be fatal. These events can occur at any time during use and without warning symptoms. Elderly patients and patients with a prior history of peptic ulcer disease and/or GI bleeding are at greater risk for serious GI events. Nonsteroidal anti-inflammatory drugs (NSAIDs) cause an increased risk of serious cardiovascular events, including heart attack and stroke, which can be fatal. This risk may occur early in treatment and may increase with duration of use. Use with caution. 3 hours after taking the Ibuprofen, alternate with Tylenol 500 mg. Tylenol 500mg   Take 1 tablet by mouth every 8 hours as needed for pain. Tylenol is highly effective when combined with an anti-inflammatory (NSAID) drug such as: ibuprofen (Advil, Aleve, Motrin, Naproxen). If you were prescribed both medications, take them together. They work better when taken together vs one or the other. Tylenol is in other pain medications, such as Vicodin and Percocet. Do not take more than 3,000mg (6 tablets) of Tylenol in one day. Do not consume more than 3 alcoholic beverages while taking Tylenol. Do not take Tylenol if you have liver disease.     See attached instructions to help with your symptoms. Follow-up with your primary care physician as soon as possible regarding your visit. Be sure to return to the Emergency Department immediately if symptoms worsen or new symptoms occur as we discussed, such as severe headache, passing out, focal weakness. PLEASE FOLLOW UP WITH:  [facility] AT [place] [address] [address] [id number] [phone]    Schedule an appointment as soon as possible for a visit   To establish care with a primary care provider    Additional important information has been included with this packet, please make sure that you have read this information as it will better help you understand you illness/injury better.

## 2023-08-30 ENCOUNTER — HOSPITAL ENCOUNTER (EMERGENCY)
Age: 28
Discharge: HOME | End: 2023-08-30
Attending: EMERGENCY MEDICINE
Payer: COMMERCIAL

## 2023-08-30 VITALS
BODY MASS INDEX: 24.71 KG/M2 | RESPIRATION RATE: 16 BRPM | DIASTOLIC BLOOD PRESSURE: 81 MMHG | HEART RATE: 70 BPM | TEMPERATURE: 98 F | SYSTOLIC BLOOD PRESSURE: 112 MMHG | WEIGHT: 163 LBS | HEIGHT: 68 IN | OXYGEN SATURATION: 99 %

## 2023-08-30 DIAGNOSIS — A59.9 TRICHOMONAS INFECTION: Primary | ICD-10-CM

## 2023-08-30 LAB
BACTERIA URNS QL MICRO: ABNORMAL /HPF
BILIRUB UR QL STRIP.AUTO: NEGATIVE
CLARITY UR: CLEAR
COLOR UR: YELLOW
EPI CELLS #/AREA URNS HPF: ABNORMAL /HPF (ref 0–5)
GLUCOSE UR STRIP.AUTO-MCNC: NEGATIVE MG/DL
HCG UR QL: NEGATIVE
HGB UR QL STRIP.AUTO: ABNORMAL
KETONES UR STRIP.AUTO-MCNC: NEGATIVE MG/DL
LEUKOCYTE ESTERASE UR QL STRIP.AUTO: NEGATIVE
NITRITE UR QL STRIP.AUTO: NEGATIVE
PH UR STRIP.AUTO: 6.5 [PH] (ref 5–8)
PROT UR STRIP.AUTO-MCNC: NEGATIVE MG/DL
RBC #/AREA URNS HPF: ABNORMAL /HPF (ref 0–4)
RENAL EPI CELLS #/AREA UR COMP ASSIST: ABNORMAL /HPF (ref 0–1)
SP GR UR STRIP.AUTO: 1.02 (ref 1–1.03)
TRICHOMONAS #/AREA URNS HPF: PRESENT /HPF
UA COMPLETE W REFLEX CULTURE PNL UR: ABNORMAL
UA DIPSTICK W REFLEX MICRO PNL UR: YES
URN SPEC COLLECT METH UR: ABNORMAL
UROBILINOGEN UR STRIP-ACNC: 1 E.U./DL
WBC #/AREA URNS HPF: ABNORMAL /HPF (ref 0–5)

## 2023-08-30 PROCEDURE — 84703 CHORIONIC GONADOTROPIN ASSAY: CPT

## 2023-08-30 PROCEDURE — 99283 EMERGENCY DEPT VISIT LOW MDM: CPT

## 2023-08-30 PROCEDURE — 87591 N.GONORRHOEAE DNA AMP PROB: CPT

## 2023-08-30 PROCEDURE — 81001 URINALYSIS AUTO W/SCOPE: CPT

## 2023-08-30 PROCEDURE — 87491 CHLMYD TRACH DNA AMP PROBE: CPT

## 2023-08-30 RX ORDER — METRONIDAZOLE 500 MG/1
500 TABLET ORAL 2 TIMES DAILY
Qty: 14 TABLET | Refills: 0 | Status: SHIPPED | OUTPATIENT
Start: 2023-08-30 | End: 2023-09-06

## 2023-08-30 ASSESSMENT — PAIN - FUNCTIONAL ASSESSMENT: PAIN_FUNCTIONAL_ASSESSMENT: 0-10

## 2023-08-30 ASSESSMENT — PAIN DESCRIPTION - DESCRIPTORS: DESCRIPTORS: DISCOMFORT

## 2023-08-30 ASSESSMENT — LIFESTYLE VARIABLES
HOW OFTEN DO YOU HAVE A DRINK CONTAINING ALCOHOL: NEVER
HOW MANY STANDARD DRINKS CONTAINING ALCOHOL DO YOU HAVE ON A TYPICAL DAY: PATIENT DOES NOT DRINK

## 2023-08-30 ASSESSMENT — PAIN DESCRIPTION - LOCATION: LOCATION: BACK

## 2023-08-30 ASSESSMENT — PAIN DESCRIPTION - ORIENTATION: ORIENTATION: LOWER

## 2023-08-30 ASSESSMENT — PAIN SCALES - GENERAL: PAINLEVEL_OUTOF10: 7

## 2023-08-30 NOTE — ED NOTES
Pt condition good at discharge. Pt verbalized understanding of discharge instructions.  Pt discharged with maría and Apryl Tesfaye RN  08/30/23 0321

## 2023-08-30 NOTE — DISCHARGE INSTRUCTIONS
Return to emergency department for worsening symptoms or other concerns, pain or fever, or worsening symptoms. Take the Flagyl as prescribed. We will contact you if you tests show need for further treatment.

## 2023-08-31 LAB
C TRACH DNA CVX QL NAA+PROBE: NEGATIVE
N GONORRHOEA DNA CERV MUCUS QL NAA+PROBE: NEGATIVE

## 2024-04-06 ENCOUNTER — HOSPITAL ENCOUNTER (EMERGENCY)
Age: 29
Discharge: HOME | End: 2024-04-07

## 2024-04-06 ENCOUNTER — APPOINTMENT (OUTPATIENT)
Dept: CT IMAGING | Age: 29
End: 2024-04-06

## 2024-04-06 VITALS
HEART RATE: 86 BPM | OXYGEN SATURATION: 99 % | HEIGHT: 68 IN | BODY MASS INDEX: 24.52 KG/M2 | TEMPERATURE: 98.2 F | WEIGHT: 161.82 LBS | SYSTOLIC BLOOD PRESSURE: 107 MMHG | RESPIRATION RATE: 17 BRPM | DIASTOLIC BLOOD PRESSURE: 70 MMHG

## 2024-04-06 DIAGNOSIS — K43.9 VENTRAL HERNIA WITHOUT OBSTRUCTION OR GANGRENE: Primary | ICD-10-CM

## 2024-04-06 DIAGNOSIS — K59.00 CONSTIPATION, UNSPECIFIED CONSTIPATION TYPE: ICD-10-CM

## 2024-04-06 DIAGNOSIS — N39.0 URINARY TRACT INFECTION WITHOUT HEMATURIA, SITE UNSPECIFIED: ICD-10-CM

## 2024-04-06 DIAGNOSIS — N83.202 CYST OF LEFT OVARY: ICD-10-CM

## 2024-04-06 LAB
ALBUMIN SERPL-MCNC: 4.4 G/DL (ref 3.4–5)
ALBUMIN/GLOB SERPL: 1.7 {RATIO} (ref 1.1–2.2)
ALP SERPL-CCNC: 70 U/L (ref 40–129)
ALT SERPL-CCNC: 7 U/L (ref 10–40)
ANION GAP SERPL CALCULATED.3IONS-SCNC: 8 MMOL/L (ref 3–16)
AST SERPL-CCNC: 16 U/L (ref 15–37)
BACTERIA URNS QL MICRO: ABNORMAL /HPF
BASOPHILS # BLD: 0 K/UL (ref 0–0.2)
BASOPHILS NFR BLD: 0.6 %
BILIRUB SERPL-MCNC: 0.3 MG/DL (ref 0–1)
BILIRUB UR QL STRIP.AUTO: NEGATIVE
BUN SERPL-MCNC: 10 MG/DL (ref 7–20)
CALCIUM SERPL-MCNC: 9 MG/DL (ref 8.3–10.6)
CHLORIDE SERPL-SCNC: 101 MMOL/L (ref 99–110)
CLARITY UR: CLEAR
CO2 SERPL-SCNC: 29 MMOL/L (ref 21–32)
COLOR UR: YELLOW
CREAT SERPL-MCNC: 0.8 MG/DL (ref 0.6–1.1)
DEPRECATED RDW RBC AUTO: 13.7 % (ref 12.4–15.4)
EOSINOPHIL # BLD: 0.1 K/UL (ref 0–0.6)
EOSINOPHIL NFR BLD: 0.8 %
EPI CELLS #/AREA URNS AUTO: 15 /HPF (ref 0–5)
GFR SERPLBLD CREATININE-BSD FMLA CKD-EPI: >90 ML/MIN/{1.73_M2}
GLUCOSE SERPL-MCNC: 111 MG/DL (ref 70–99)
GLUCOSE UR STRIP.AUTO-MCNC: NEGATIVE MG/DL
HCG SERPL QL: NEGATIVE
HCT VFR BLD AUTO: 36.8 % (ref 36–48)
HGB BLD-MCNC: 12 G/DL (ref 12–16)
HGB UR QL STRIP.AUTO: NEGATIVE
HYALINE CASTS #/AREA URNS AUTO: 0 /LPF (ref 0–8)
KETONES UR STRIP.AUTO-MCNC: ABNORMAL MG/DL
LACTATE BLDV-SCNC: 0.7 MMOL/L (ref 0.4–2)
LEUKOCYTE ESTERASE UR QL STRIP.AUTO: ABNORMAL
LIPASE SERPL-CCNC: 38 U/L (ref 13–60)
LYMPHOCYTES # BLD: 2 K/UL (ref 1–5.1)
LYMPHOCYTES NFR BLD: 30.4 %
MCH RBC QN AUTO: 27.8 PG (ref 26–34)
MCHC RBC AUTO-ENTMCNC: 32.6 G/DL (ref 31–36)
MCV RBC AUTO: 85.3 FL (ref 80–100)
MONOCYTES # BLD: 0.5 K/UL (ref 0–1.3)
MONOCYTES NFR BLD: 6.9 %
NEUTROPHILS # BLD: 4.1 K/UL (ref 1.7–7.7)
NEUTROPHILS NFR BLD: 61.3 %
NITRITE UR QL STRIP.AUTO: NEGATIVE
PH UR STRIP.AUTO: 8 [PH] (ref 5–8)
PLATELET # BLD AUTO: 235 K/UL (ref 135–450)
PMV BLD AUTO: 9.1 FL (ref 5–10.5)
POTASSIUM SERPL-SCNC: 3.8 MMOL/L (ref 3.5–5.1)
PROT SERPL-MCNC: 7 G/DL (ref 6.4–8.2)
PROT UR STRIP.AUTO-MCNC: 30 MG/DL
RBC # BLD AUTO: 4.31 M/UL (ref 4–5.2)
RBC CLUMPS #/AREA URNS AUTO: 1 /HPF (ref 0–4)
SODIUM SERPL-SCNC: 138 MMOL/L (ref 136–145)
SP GR UR STRIP.AUTO: 1.02 (ref 1–1.03)
UA COMPLETE W REFLEX CULTURE PNL UR: ABNORMAL
UA DIPSTICK W REFLEX MICRO PNL UR: YES
URN SPEC COLLECT METH UR: ABNORMAL
UROBILINOGEN UR STRIP-ACNC: 1 E.U./DL
WBC # BLD AUTO: 6.6 K/UL (ref 4–11)
WBC #/AREA URNS AUTO: 2 /HPF (ref 0–5)

## 2024-04-06 PROCEDURE — 74177 CT ABD & PELVIS W/CONTRAST: CPT

## 2024-04-06 PROCEDURE — 83690 ASSAY OF LIPASE: CPT

## 2024-04-06 PROCEDURE — 96374 THER/PROPH/DIAG INJ IV PUSH: CPT

## 2024-04-06 PROCEDURE — 99285 EMERGENCY DEPT VISIT HI MDM: CPT

## 2024-04-06 PROCEDURE — 85025 COMPLETE CBC W/AUTO DIFF WBC: CPT

## 2024-04-06 PROCEDURE — 6360000002 HC RX W HCPCS: Performed by: PHYSICIAN ASSISTANT

## 2024-04-06 PROCEDURE — 6360000004 HC RX CONTRAST MEDICATION: Performed by: PHYSICIAN ASSISTANT

## 2024-04-06 PROCEDURE — 96375 TX/PRO/DX INJ NEW DRUG ADDON: CPT

## 2024-04-06 PROCEDURE — 81001 URINALYSIS AUTO W/SCOPE: CPT

## 2024-04-06 PROCEDURE — 83605 ASSAY OF LACTIC ACID: CPT

## 2024-04-06 PROCEDURE — 84703 CHORIONIC GONADOTROPIN ASSAY: CPT

## 2024-04-06 PROCEDURE — 80053 COMPREHEN METABOLIC PANEL: CPT

## 2024-04-06 RX ORDER — KETOROLAC TROMETHAMINE 15 MG/ML
15 INJECTION, SOLUTION INTRAMUSCULAR; INTRAVENOUS ONCE
Status: COMPLETED | OUTPATIENT
Start: 2024-04-06 | End: 2024-04-06

## 2024-04-06 RX ORDER — ONDANSETRON 2 MG/ML
4 INJECTION INTRAMUSCULAR; INTRAVENOUS ONCE
Status: COMPLETED | OUTPATIENT
Start: 2024-04-06 | End: 2024-04-06

## 2024-04-06 RX ADMIN — ONDANSETRON 4 MG: 2 INJECTION INTRAMUSCULAR; INTRAVENOUS at 22:30

## 2024-04-06 RX ADMIN — KETOROLAC TROMETHAMINE 15 MG: 15 INJECTION, SOLUTION INTRAMUSCULAR; INTRAVENOUS at 22:31

## 2024-04-06 RX ADMIN — IOPAMIDOL 75 ML: 755 INJECTION, SOLUTION INTRAVENOUS at 23:25

## 2024-04-06 ASSESSMENT — PAIN SCALES - GENERAL
PAINLEVEL_OUTOF10: 10
PAINLEVEL_OUTOF10: 10

## 2024-04-06 ASSESSMENT — PAIN DESCRIPTION - LOCATION: LOCATION: ABDOMEN

## 2024-04-06 ASSESSMENT — PAIN - FUNCTIONAL ASSESSMENT: PAIN_FUNCTIONAL_ASSESSMENT: 0-10

## 2024-04-07 RX ORDER — POLYETHYLENE GLYCOL 3350 17 G/17G
17 POWDER, FOR SOLUTION ORAL DAILY
Qty: 1530 G | Refills: 1 | Status: SHIPPED | OUTPATIENT
Start: 2024-04-07 | End: 2024-05-07

## 2024-04-07 RX ORDER — CEFUROXIME AXETIL 250 MG/1
250 TABLET ORAL 2 TIMES DAILY
Qty: 14 TABLET | Refills: 0 | Status: SHIPPED | OUTPATIENT
Start: 2024-04-07 | End: 2024-04-14

## 2024-04-07 RX ORDER — CEFUROXIME AXETIL 250 MG/1
250 TABLET ORAL 2 TIMES DAILY
Qty: 14 TABLET | Refills: 0 | Status: SHIPPED | OUTPATIENT
Start: 2024-04-07 | End: 2024-04-07

## 2024-04-07 NOTE — DISCHARGE INSTRUCTIONS
was within the past 12 months)  Benign Cyst (fluid-filled sac)  Size is less than or equal to 5 cm, no further imaging recommended  Size is greater than 5 cm, pelvic ultrasound in the next 6-12 weeks  Size is greater than 10 cm, pelvic ultrasound promptly  Probable Benign Cyst (Growth irregularities such as the shape is not round/oval, growth not well imaged due to limitation of the imaging, or the cyst is not fluid-filled and is solid)  Size is less than or equal to 3 cm, no further imaging recommended  Size is between 3 cm and 5 cm, pelvic ultrasound in the next 6-12 weeks  Size is greater than 5 cm, pelvic ultrasound promptly  Early menopausal (last menstrual period more than 12 months ago but less than 5 years ago)  Benign Cyst   Size is less than or equal to 3 cm, no further imaging recommended  Size is between 3 cm and 5 cm, pelvic ultrasound in the next 6-12 weeks  Size is greater than 5 cm, pelvic ultrasound promptly  Probable Benign Cyst   Size is less than or equal to 3 cm, no further imaging recommended  Size is greater than 3 cm, pelvic ultrasound promptly  Late menopausal (last menstrual period more than 5 years ago)  Benign Cyst   Size is less than or equal to 3 cm, no further imaging recommended  Size is greater than 3 cm, pelvic ultrasound promptly  Probable Benign Cyst   Size is less than or equal to 1 cm, no further imaging recommended  Size is greater than 1 cm, pelvic ultrasound promptly    Below are the recommended guidelines for management of simple ovarian cyst found on ultrasound, you can discuss these recommendations at your follow-up appointment.    Premenopausal (you are less than 50 years of age, or last menstrual period was within the past 12 months)  Size is less than 5 cm, no further imaging recommended  Size is between 5 cm and 7 cm, yearly follow-up with pelvic ultrasound  Size is greater than 7 cm, consider pelvic MRI or surgery  Postmenopausal (last menstrual period more than 12

## 2024-04-07 NOTE — ED PROVIDER NOTES
[facility] EMERGENCY DEPARTMENT  EMERGENCY DEPARTMENT ENCOUNTER        Pt Name: Roxana Tovar  MRN: [id number]  Birthdate 1995  Date of evaluation: 4/6/2024  Provider: KRISTI Ruelas  PCP: No primary care provider on file.  Note Started: 6:24 PM EDT 4/7/24      JOÃO. I have evaluated this patient.        CHIEF COMPLAINT       Chief Complaint   Patient presents with    Abdominal Pain     Pt presents with pain due to hernia, also has N/V. Pt states she has not had BM in a week.        HISTORY OF PRESENT ILLNESS: 1 or more Elements     History from : Patient    Limitations to history : None    Roxana Tovar is a 28 y.o. female who presents complaining of mid abdominal pain at the site of what she believes is a hernia.  6 years ago she had a vaginal delivery and tells me that she developed a hernia above her bellybutton after that.  She has never been seen by primary care doctor or had this diagnosed but tells me she has a family member who looked at it and told her it was a hernia.  For about a week she has had some worse pain and some constipation.  Has not felt like she had a good bowel movement in a week.  Denies prior history of ovarian cyst, vaginal discharge or urinary symptoms.  She had a couple episodes of nonbloody emesis.  Past medical history of anemia.  Rates her pain at 10 out of 10.    Nursing Notes were all reviewed and agreed with or any disagreements were addressed in the HPI.    REVIEW OF SYSTEMS :      Review of Systems   Constitutional:  Negative for fever.   Respiratory:  Negative for shortness of breath.    Cardiovascular:  Negative for chest pain.   Gastrointestinal:  Positive for abdominal pain, constipation, nausea and vomiting. Negative for blood in stool.   Genitourinary:  Negative for dysuria.   Musculoskeletal:  Negative for back pain.   Skin:  Negative for color change, rash and wound.   Neurological:  Negative for weakness.   Psychiatric/Behavioral:  Negative

## 2024-04-07 NOTE — ED TRIAGE NOTES
Pt presents with abdominal pain, nausea, vomiting for a week. Pt states she has known hernia and it is causing her pain. Pt also states she has not had BM in a week.

## 2024-04-10 ENCOUNTER — INITIAL CONSULT (OUTPATIENT)
Dept: SURGERY | Age: 29
End: 2024-04-10

## 2024-04-10 VITALS — BODY MASS INDEX: 24.48 KG/M2 | WEIGHT: 161 LBS

## 2024-04-10 DIAGNOSIS — K42.9 UMBILICAL HERNIA WITHOUT OBSTRUCTION AND WITHOUT GANGRENE: Primary | ICD-10-CM

## 2024-04-10 DIAGNOSIS — K42.9 UMBILICAL HERNIA WITHOUT OBSTRUCTION OR GANGRENE: ICD-10-CM

## 2024-04-10 PROCEDURE — 99204 OFFICE O/P NEW MOD 45 MIN: CPT | Performed by: STUDENT IN AN ORGANIZED HEALTH CARE EDUCATION/TRAINING PROGRAM

## 2024-04-10 RX ORDER — SODIUM CHLORIDE 0.9 % (FLUSH) 0.9 %
5-40 SYRINGE (ML) INJECTION PRN
OUTPATIENT
Start: 2024-04-10

## 2024-04-10 RX ORDER — ENOXAPARIN SODIUM 100 MG/ML
40 INJECTION SUBCUTANEOUS ONCE
OUTPATIENT
Start: 2024-04-10 | End: 2024-04-10

## 2024-04-10 RX ORDER — SODIUM CHLORIDE, SODIUM LACTATE, POTASSIUM CHLORIDE, CALCIUM CHLORIDE 600; 310; 30; 20 MG/100ML; MG/100ML; MG/100ML; MG/100ML
INJECTION, SOLUTION INTRAVENOUS CONTINUOUS
OUTPATIENT
Start: 2024-04-10

## 2024-04-10 RX ORDER — SODIUM CHLORIDE 9 MG/ML
INJECTION, SOLUTION INTRAVENOUS PRN
OUTPATIENT
Start: 2024-04-10

## 2024-04-10 RX ORDER — SODIUM CHLORIDE 0.9 % (FLUSH) 0.9 %
5-40 SYRINGE (ML) INJECTION EVERY 12 HOURS SCHEDULED
OUTPATIENT
Start: 2024-04-10

## 2024-04-10 NOTE — H&P (VIEW-ONLY)
GENERAL SURGERY  Office Note    Patient Name: Roxana Tovar  MRN: [id number]  YOB: 1995   Date of Service: 4/10/2024    Referred by:  ED provider    Chief Complaint   Patient presents with    New Patient     Ref by ED for hernia. Pt states she has a hernia, umbilical, painful has been there for six years. She had her son 6 years ago, then it was ok but now it has gotten worse, vomiting.        SUBJECTIVE:     Roxana is a 28 y.o. female.  She presents with 1 for umbilical hernia.  She was recently seen in the emergency department for the same.  She reports she was pregnant with her son 6 years ago when she began to notice the hernia.  Has previously been asymptomatic.  Presently, it is becoming larger and more protuberant.  It is becoming incredibly painful.  She can barely touch the area and it is limiting her movement.  She denies nausea or vomiting.  She denies any issues with bowel function or obstructive symptoms.  She is having bowel movements.    There is no overlying skin changes.    She has never had abdominal surgery.  She has never had general anesthesia.  She takes no blood thinners      REVIEW OF SYSTEMS  A comprehensive review of systems was completed and is negative except for any elements noted above.    Past Medical History:   Diagnosis Date    Anemia     Headache(784.0)      Past Surgical History:   Procedure Laterality Date    INDUCED   2021    TONSILLECTOMY       Prior to Admission medications    Medication Sig Start Date End Date Taking? Authorizing Provider   polyethylene glycol (GLYCOLAX) 17 GM/SCOOP powder Take 17 g by mouth daily 24 Yes Divina Lang PA   cefUROXime (CEFTIN) 250 MG tablet Take 1 tablet by mouth 2 times daily for 7 days 24 Yes Divina Lang PA   ibuprofen (ADVIL;MOTRIN) 600 MG tablet Take 1 tablet by mouth every 6 hours 3/13/21  Yes Shyann Sanderson MD     Social History     Tobacco Use    Smoking status: Never

## 2024-04-10 NOTE — PROGRESS NOTES
Smokeless tobacco: Never   Vaping Use    Vaping Use: Never used   Substance Use Topics    Alcohol use: No    Drug use: No     No family history on file.    OBJECTIVE:     Wt 73 kg (161 lb)   LMP 03/15/2024   BMI 24.48 kg/m²     PHYSICAL EXAM  General/appearance: Awake and alert, in no acute distress  Lungs: Respirations unlabored  Breast: Deferred  Cardiac: Regular rate  Abdomen: soft, non-distended, tender to palpation: periumbilical ; moderate  Hernia: Umbilical hernia, Reducible , No overlying skin changes , tender to palpation  Rectal: Deferred  Incision: None  Extremities: Warm and well perfused, no edema  Neuro: No focal findings  Skin: No rashes or wounds    No chaperone needed for this physical exam.     LABS:     CBC   Lab Results   Component Value Date/Time    WBC 6.6 04/06/2024 09:49 PM    HGB 12.0 04/06/2024 09:49 PM    HCT 36.8 04/06/2024 09:49 PM     04/06/2024 09:49 PM    MCV 85.3 04/06/2024 09:49 PM     BMP   Lab Results   Component Value Date/Time     04/06/2024 09:49 PM    K 3.8 04/06/2024 09:49 PM     04/06/2024 09:49 PM    CO2 29 04/06/2024 09:49 PM    BUN 10 04/06/2024 09:49 PM    GFRAA >60 03/13/2021 10:52 AM        IMAGING:     Not applicable, CT ABDOMEN PELVIS W IV CONTRAST Additional Contrast? None    Result Date: 4/8/2024  1. No acute abdominal or pelvic abnormality. 2. 4.3 cm simple appearing cyst in the left ovary/adnexa. RECOMMENDATIONS: Left ovarian simple-appearing cyst measuring 4.3 cm. No follow-up imaging is recommended. Reference: JACR 2020 Feb;17(2):248-254      ASSESSMENT & PLAN:     Roxana is a 28 y.o. female.  She presents with an umbilical hernia.  It is reducible on exam.  However, it is markedly tender.  There are no overlying skin changes.  There are no concerning secondary findings for incarceration or strangulation, or compromised bowel.  She denies nausea or vomiting she is having bowel function.  She is not obstructed.    Labs from the emergency room

## 2024-04-11 NOTE — PROGRESS NOTES
[facility] Pre-Admission Testing Electronic Communication Worksheet for OR/ENDO Procedures        Patient: Roxana Tovar    DOS: 4/15    Transportation Confirmed: [x] YES    []  NO    History and Physical:  [] YES- adams        []  NO  [] N/A  If yes, please list doctor or Urgent Care and date of H&P:     Additional Clearance(Cardiac, Pulmonary, etc):  [] YES    [x]  NO    Pre-Admission Testing Visit:  [] YES    [x]  NO If no, do labs/testing need to be done DOS?  [] YES    [x]  NO    Medication Reconciliation Complete:  [x] YES    []  NO        Additional Notes:    Pt has multiple dermal piercings that she can not remove- she is educated to tell RN and Anethesia            Interview Complete: [x] YES    []  NO          Serena Jimenez RN  11:58 AM

## 2024-04-11 NOTE — PROGRESS NOTES
[facility] PRE-OPERATIVE INSTRUCTIONS    Day of Procedure:    4/15            Arrival time:        11        Surgery time:1225    Take the following medications with a sip of water:  Follow your MD/Surgeons pre-procedure instructions regarding your medications     Do not eat or drink anything after 12:00 midnight prior to your surgery.  This includes water chewing gum, mints and ice chips.   You may brush your teeth and gargle the morning of your surgery, but do not swallow the water     Please see your family doctor/pediatrician for a history and physical and/or concerning medications.   Bring any test results/reports from your physicians office.   If you are under the care of a heart doctor or specialist doctor, please be aware that you may be asked to them for clearance    You may be asked to stop blood thinners such as Coumadin, Plavix, Fragmin, Lovenox, etc., or any anti-inflammatories such as:  Aspirin, Ibuprofen, Advil, Naproxen prior to your surgery.    We also ask that you stop any OTC medications such as fish oil, vitamin E, glucosamine, garlic, Multivitamins, COQ 10, etc.    We ask that you do not smoke 24 hours prior to surgery  We ask that you do not  drink any alcoholic beverages 24 hours prior to surgery     You must make arrangements for a responsible adult to take you home after your surgery.    For your safety you will not be allowed to leave alone or drive yourself home.  Your surgery will be cancelled if you do not have a ride home.     Also for your safety, it is strongly suggested that someone stay with you the first 24 hours after your surgery.     A parent or legal guardian must accompany a child scheduled for surgery and plan to stay at the hospital until the child is discharged.    Please do not bring other children with you.    For your comfort, please wear simple loose fitting clothing to the hospital.  Please do not bring valuables.    Do not wear any make-up or nail polish

## 2024-04-12 ENCOUNTER — ANESTHESIA EVENT (OUTPATIENT)
Dept: OPERATING ROOM | Age: 29
End: 2024-04-12

## 2024-04-15 ENCOUNTER — ANESTHESIA (OUTPATIENT)
Dept: OPERATING ROOM | Age: 29
End: 2024-04-15

## 2024-04-15 ENCOUNTER — HOSPITAL ENCOUNTER (OUTPATIENT)
Age: 29
Setting detail: OUTPATIENT SURGERY
Discharge: HOME | End: 2024-04-15
Attending: STUDENT IN AN ORGANIZED HEALTH CARE EDUCATION/TRAINING PROGRAM | Admitting: STUDENT IN AN ORGANIZED HEALTH CARE EDUCATION/TRAINING PROGRAM
Payer: COMMERCIAL

## 2024-04-15 VITALS
TEMPERATURE: 98 F | RESPIRATION RATE: 16 BRPM | BODY MASS INDEX: 24.39 KG/M2 | SYSTOLIC BLOOD PRESSURE: 122 MMHG | DIASTOLIC BLOOD PRESSURE: 76 MMHG | HEIGHT: 68 IN | WEIGHT: 160.94 LBS | OXYGEN SATURATION: 97 % | HEART RATE: 74 BPM

## 2024-04-15 DIAGNOSIS — K42.9 UMBILICAL HERNIA WITHOUT OBSTRUCTION OR GANGRENE: Primary | ICD-10-CM

## 2024-04-15 LAB — HCG UR QL: NEGATIVE

## 2024-04-15 PROCEDURE — 49594 RPR AA HRN 1ST 3-10 NCR/STRN: CPT | Performed by: STUDENT IN AN ORGANIZED HEALTH CARE EDUCATION/TRAINING PROGRAM

## 2024-04-15 PROCEDURE — 2709999900 HC NON-CHARGEABLE SUPPLY: Performed by: STUDENT IN AN ORGANIZED HEALTH CARE EDUCATION/TRAINING PROGRAM

## 2024-04-15 PROCEDURE — 84703 CHORIONIC GONADOTROPIN ASSAY: CPT

## 2024-04-15 PROCEDURE — 7100000000 HC PACU RECOVERY - FIRST 15 MIN: Performed by: STUDENT IN AN ORGANIZED HEALTH CARE EDUCATION/TRAINING PROGRAM

## 2024-04-15 PROCEDURE — 2500000003 HC RX 250 WO HCPCS

## 2024-04-15 PROCEDURE — 2580000003 HC RX 258

## 2024-04-15 PROCEDURE — 7100000011 HC PHASE II RECOVERY - ADDTL 15 MIN: Performed by: STUDENT IN AN ORGANIZED HEALTH CARE EDUCATION/TRAINING PROGRAM

## 2024-04-15 PROCEDURE — 3600000005 HC SURGERY LEVEL 5 BASE: Performed by: STUDENT IN AN ORGANIZED HEALTH CARE EDUCATION/TRAINING PROGRAM

## 2024-04-15 PROCEDURE — 2580000003 HC RX 258: Performed by: STUDENT IN AN ORGANIZED HEALTH CARE EDUCATION/TRAINING PROGRAM

## 2024-04-15 PROCEDURE — 6360000002 HC RX W HCPCS: Performed by: STUDENT IN AN ORGANIZED HEALTH CARE EDUCATION/TRAINING PROGRAM

## 2024-04-15 PROCEDURE — 6360000002 HC RX W HCPCS

## 2024-04-15 PROCEDURE — C1781 MESH (IMPLANTABLE): HCPCS | Performed by: STUDENT IN AN ORGANIZED HEALTH CARE EDUCATION/TRAINING PROGRAM

## 2024-04-15 PROCEDURE — 2500000003 HC RX 250 WO HCPCS: Performed by: STUDENT IN AN ORGANIZED HEALTH CARE EDUCATION/TRAINING PROGRAM

## 2024-04-15 PROCEDURE — 6370000000 HC RX 637 (ALT 250 FOR IP): Performed by: ANESTHESIOLOGY

## 2024-04-15 PROCEDURE — 3700000000 HC ANESTHESIA ATTENDED CARE: Performed by: STUDENT IN AN ORGANIZED HEALTH CARE EDUCATION/TRAINING PROGRAM

## 2024-04-15 PROCEDURE — 7100000010 HC PHASE II RECOVERY - FIRST 15 MIN: Performed by: STUDENT IN AN ORGANIZED HEALTH CARE EDUCATION/TRAINING PROGRAM

## 2024-04-15 PROCEDURE — 3700000001 HC ADD 15 MINUTES (ANESTHESIA): Performed by: STUDENT IN AN ORGANIZED HEALTH CARE EDUCATION/TRAINING PROGRAM

## 2024-04-15 PROCEDURE — 7100000001 HC PACU RECOVERY - ADDTL 15 MIN: Performed by: STUDENT IN AN ORGANIZED HEALTH CARE EDUCATION/TRAINING PROGRAM

## 2024-04-15 PROCEDURE — A4217 STERILE WATER/SALINE, 500 ML: HCPCS | Performed by: STUDENT IN AN ORGANIZED HEALTH CARE EDUCATION/TRAINING PROGRAM

## 2024-04-15 PROCEDURE — 3600000015 HC SURGERY LEVEL 5 ADDTL 15MIN: Performed by: STUDENT IN AN ORGANIZED HEALTH CARE EDUCATION/TRAINING PROGRAM

## 2024-04-15 PROCEDURE — 6360000002 HC RX W HCPCS: Performed by: ANESTHESIOLOGY

## 2024-04-15 DEVICE — VENTRALEX ST HERNIA PATCH, 8.0 CM (3.2"), CIRCLE
Type: IMPLANTABLE DEVICE | Site: UMBILICAL | Status: FUNCTIONAL
Brand: VENTRALEX

## 2024-04-15 RX ORDER — ACETAMINOPHEN 500 MG
1000 TABLET ORAL EVERY 8 HOURS
Refills: 0 | COMMUNITY
Start: 2024-04-15 | End: 2024-04-20

## 2024-04-15 RX ORDER — FENTANYL CITRATE 50 UG/ML
INJECTION, SOLUTION INTRAMUSCULAR; INTRAVENOUS PRN
Status: DISCONTINUED | OUTPATIENT
Start: 2024-04-15 | End: 2024-04-15 | Stop reason: SDUPTHER

## 2024-04-15 RX ORDER — IBUPROFEN 200 MG
400 TABLET ORAL EVERY 6 HOURS PRN
Refills: 0 | COMMUNITY
Start: 2024-04-15 | End: 2024-04-20

## 2024-04-15 RX ORDER — DIPHENHYDRAMINE HYDROCHLORIDE 50 MG/ML
12.5 INJECTION INTRAMUSCULAR; INTRAVENOUS
Status: DISCONTINUED | OUTPATIENT
Start: 2024-04-15 | End: 2024-04-15 | Stop reason: HOSPADM

## 2024-04-15 RX ORDER — MEPERIDINE HYDROCHLORIDE 25 MG/ML
12.5 INJECTION INTRAMUSCULAR; INTRAVENOUS; SUBCUTANEOUS
Status: DISCONTINUED | OUTPATIENT
Start: 2024-04-15 | End: 2024-04-15 | Stop reason: HOSPADM

## 2024-04-15 RX ORDER — NALOXONE HYDROCHLORIDE 0.4 MG/ML
INJECTION, SOLUTION INTRAMUSCULAR; INTRAVENOUS; SUBCUTANEOUS PRN
Status: DISCONTINUED | OUTPATIENT
Start: 2024-04-15 | End: 2024-04-15 | Stop reason: HOSPADM

## 2024-04-15 RX ORDER — SODIUM CHLORIDE 0.9 % (FLUSH) 0.9 %
5-40 SYRINGE (ML) INJECTION EVERY 12 HOURS SCHEDULED
Status: DISCONTINUED | OUTPATIENT
Start: 2024-04-15 | End: 2024-04-15 | Stop reason: HOSPADM

## 2024-04-15 RX ORDER — MIDAZOLAM HYDROCHLORIDE 1 MG/ML
INJECTION INTRAMUSCULAR; INTRAVENOUS PRN
Status: DISCONTINUED | OUTPATIENT
Start: 2024-04-15 | End: 2024-04-15 | Stop reason: SDUPTHER

## 2024-04-15 RX ORDER — SODIUM CHLORIDE 0.9 % (FLUSH) 0.9 %
5-40 SYRINGE (ML) INJECTION PRN
Status: DISCONTINUED | OUTPATIENT
Start: 2024-04-15 | End: 2024-04-15 | Stop reason: HOSPADM

## 2024-04-15 RX ORDER — FENTANYL CITRATE 0.05 MG/ML
50 INJECTION, SOLUTION INTRAMUSCULAR; INTRAVENOUS EVERY 5 MIN PRN
Status: DISCONTINUED | OUTPATIENT
Start: 2024-04-15 | End: 2024-04-15 | Stop reason: HOSPADM

## 2024-04-15 RX ORDER — ENOXAPARIN SODIUM 100 MG/ML
40 INJECTION SUBCUTANEOUS ONCE
Status: COMPLETED | OUTPATIENT
Start: 2024-04-15 | End: 2024-04-15

## 2024-04-15 RX ORDER — KETOROLAC TROMETHAMINE 30 MG/ML
INJECTION, SOLUTION INTRAMUSCULAR; INTRAVENOUS PRN
Status: DISCONTINUED | OUTPATIENT
Start: 2024-04-15 | End: 2024-04-15 | Stop reason: SDUPTHER

## 2024-04-15 RX ORDER — HALOPERIDOL 5 MG/ML
1 INJECTION INTRAMUSCULAR
Status: DISCONTINUED | OUTPATIENT
Start: 2024-04-15 | End: 2024-04-15 | Stop reason: HOSPADM

## 2024-04-15 RX ORDER — SODIUM CHLORIDE 9 MG/ML
INJECTION, SOLUTION INTRAVENOUS PRN
Status: DISCONTINUED | OUTPATIENT
Start: 2024-04-15 | End: 2024-04-15 | Stop reason: HOSPADM

## 2024-04-15 RX ORDER — LORAZEPAM 2 MG/ML
0.5 INJECTION INTRAMUSCULAR
Status: DISCONTINUED | OUTPATIENT
Start: 2024-04-15 | End: 2024-04-15 | Stop reason: HOSPADM

## 2024-04-15 RX ORDER — SODIUM CHLORIDE, SODIUM LACTATE, POTASSIUM CHLORIDE, CALCIUM CHLORIDE 600; 310; 30; 20 MG/100ML; MG/100ML; MG/100ML; MG/100ML
INJECTION, SOLUTION INTRAVENOUS CONTINUOUS
Status: DISCONTINUED | OUTPATIENT
Start: 2024-04-15 | End: 2024-04-15 | Stop reason: HOSPADM

## 2024-04-15 RX ORDER — ONDANSETRON 2 MG/ML
4 INJECTION INTRAMUSCULAR; INTRAVENOUS
Status: COMPLETED | OUTPATIENT
Start: 2024-04-15 | End: 2024-04-15

## 2024-04-15 RX ORDER — DEXAMETHASONE SODIUM PHOSPHATE 4 MG/ML
INJECTION, SOLUTION INTRA-ARTICULAR; INTRALESIONAL; INTRAMUSCULAR; INTRAVENOUS; SOFT TISSUE PRN
Status: DISCONTINUED | OUTPATIENT
Start: 2024-04-15 | End: 2024-04-15 | Stop reason: SDUPTHER

## 2024-04-15 RX ORDER — ONDANSETRON 2 MG/ML
INJECTION INTRAMUSCULAR; INTRAVENOUS PRN
Status: DISCONTINUED | OUTPATIENT
Start: 2024-04-15 | End: 2024-04-15 | Stop reason: SDUPTHER

## 2024-04-15 RX ORDER — SENNA AND DOCUSATE SODIUM 50; 8.6 MG/1; MG/1
1 TABLET, FILM COATED ORAL 2 TIMES DAILY
Refills: 0 | COMMUNITY
Start: 2024-04-15 | End: 2024-04-29

## 2024-04-15 RX ORDER — MAGNESIUM HYDROXIDE 1200 MG/15ML
LIQUID ORAL CONTINUOUS PRN
Status: COMPLETED | OUTPATIENT
Start: 2024-04-15 | End: 2024-04-15

## 2024-04-15 RX ORDER — LIDOCAINE HYDROCHLORIDE 20 MG/ML
INJECTION, SOLUTION EPIDURAL; INFILTRATION; INTRACAUDAL; PERINEURAL PRN
Status: DISCONTINUED | OUTPATIENT
Start: 2024-04-15 | End: 2024-04-15 | Stop reason: SDUPTHER

## 2024-04-15 RX ORDER — SODIUM CHLORIDE 9 MG/ML
INJECTION, SOLUTION INTRAVENOUS CONTINUOUS PRN
Status: DISCONTINUED | OUTPATIENT
Start: 2024-04-15 | End: 2024-04-15 | Stop reason: SDUPTHER

## 2024-04-15 RX ORDER — OXYCODONE HYDROCHLORIDE 5 MG/1
5 TABLET ORAL ONCE
Status: COMPLETED | OUTPATIENT
Start: 2024-04-15 | End: 2024-04-15

## 2024-04-15 RX ORDER — PHENYLEPHRINE HCL IN 0.9% NACL 1 MG/10 ML
SYRINGE (ML) INTRAVENOUS PRN
Status: DISCONTINUED | OUTPATIENT
Start: 2024-04-15 | End: 2024-04-15 | Stop reason: SDUPTHER

## 2024-04-15 RX ORDER — PROPOFOL 10 MG/ML
INJECTION, EMULSION INTRAVENOUS PRN
Status: DISCONTINUED | OUTPATIENT
Start: 2024-04-15 | End: 2024-04-15 | Stop reason: SDUPTHER

## 2024-04-15 RX ORDER — OXYCODONE HYDROCHLORIDE 5 MG/1
5 TABLET ORAL EVERY 6 HOURS PRN
Qty: 10 TABLET | Refills: 0 | Status: SHIPPED | OUTPATIENT
Start: 2024-04-15 | End: 2024-04-22

## 2024-04-15 RX ADMIN — Medication 100 MCG: at 13:47

## 2024-04-15 RX ADMIN — PROPOFOL 80 MG: 10 INJECTION, EMULSION INTRAVENOUS at 13:23

## 2024-04-15 RX ADMIN — ONDANSETRON 4 MG: 2 INJECTION INTRAMUSCULAR; INTRAVENOUS at 12:38

## 2024-04-15 RX ADMIN — LIDOCAINE HYDROCHLORIDE 80 MG: 20 INJECTION, SOLUTION EPIDURAL; INFILTRATION; INTRACAUDAL; PERINEURAL at 12:38

## 2024-04-15 RX ADMIN — PROPOFOL 125 MCG/KG/MIN: 10 INJECTION, EMULSION INTRAVENOUS at 12:39

## 2024-04-15 RX ADMIN — SODIUM CHLORIDE: 9 INJECTION, SOLUTION INTRAVENOUS at 13:47

## 2024-04-15 RX ADMIN — KETOROLAC TROMETHAMINE 30 MG: 30 INJECTION, SOLUTION INTRAMUSCULAR at 14:09

## 2024-04-15 RX ADMIN — ONDANSETRON 4 MG: 2 INJECTION INTRAMUSCULAR; INTRAVENOUS at 14:38

## 2024-04-15 RX ADMIN — Medication 100 MCG: at 13:37

## 2024-04-15 RX ADMIN — PROPOFOL 50 MG: 10 INJECTION, EMULSION INTRAVENOUS at 12:38

## 2024-04-15 RX ADMIN — SODIUM CHLORIDE: 9 INJECTION, SOLUTION INTRAVENOUS at 12:34

## 2024-04-15 RX ADMIN — FENTANYL CITRATE 50 MCG: 0.05 INJECTION, SOLUTION INTRAMUSCULAR; INTRAVENOUS at 14:56

## 2024-04-15 RX ADMIN — OXYCODONE 5 MG: 5 TABLET ORAL at 16:01

## 2024-04-15 RX ADMIN — HYDROMORPHONE HYDROCHLORIDE 0.25 MG: 1 INJECTION, SOLUTION INTRAMUSCULAR; INTRAVENOUS; SUBCUTANEOUS at 14:51

## 2024-04-15 RX ADMIN — FENTANYL CITRATE 25 MCG: 50 INJECTION INTRAMUSCULAR; INTRAVENOUS at 12:34

## 2024-04-15 RX ADMIN — Medication 200 MCG: at 14:08

## 2024-04-15 RX ADMIN — SODIUM CHLORIDE 2000 MG: 900 INJECTION INTRAVENOUS at 12:40

## 2024-04-15 RX ADMIN — HYDROMORPHONE HYDROCHLORIDE 0.25 MG: 1 INJECTION, SOLUTION INTRAMUSCULAR; INTRAVENOUS; SUBCUTANEOUS at 14:40

## 2024-04-15 RX ADMIN — SODIUM CHLORIDE: 9 INJECTION, SOLUTION INTRAVENOUS at 11:39

## 2024-04-15 RX ADMIN — Medication 100 MCG: at 13:20

## 2024-04-15 RX ADMIN — FENTANYL CITRATE 25 MCG: 50 INJECTION INTRAMUSCULAR; INTRAVENOUS at 12:50

## 2024-04-15 RX ADMIN — DEXAMETHASONE SODIUM PHOSPHATE 8 MG: 4 INJECTION, SOLUTION INTRAMUSCULAR; INTRAVENOUS at 13:47

## 2024-04-15 RX ADMIN — Medication 100 MCG: at 13:27

## 2024-04-15 RX ADMIN — ENOXAPARIN SODIUM 40 MG: 100 INJECTION SUBCUTANEOUS at 11:39

## 2024-04-15 RX ADMIN — Medication 100 MCG: at 13:32

## 2024-04-15 RX ADMIN — Medication 100 MCG: at 13:10

## 2024-04-15 RX ADMIN — FENTANYL CITRATE 25 MCG: 50 INJECTION INTRAMUSCULAR; INTRAVENOUS at 12:43

## 2024-04-15 RX ADMIN — FENTANYL CITRATE 25 MCG: 50 INJECTION INTRAMUSCULAR; INTRAVENOUS at 13:09

## 2024-04-15 RX ADMIN — MIDAZOLAM 2 MG: 1 INJECTION INTRAMUSCULAR; INTRAVENOUS at 12:34

## 2024-04-15 ASSESSMENT — PAIN DESCRIPTION - ORIENTATION
ORIENTATION: MID;LOWER
ORIENTATION: MID

## 2024-04-15 ASSESSMENT — PAIN DESCRIPTION - LOCATION
LOCATION: ABDOMEN

## 2024-04-15 ASSESSMENT — PAIN - FUNCTIONAL ASSESSMENT
PAIN_FUNCTIONAL_ASSESSMENT: PREVENTS OR INTERFERES WITH MANY ACTIVE NOT PASSIVE ACTIVITIES
PAIN_FUNCTIONAL_ASSESSMENT: PREVENTS OR INTERFERES SOME ACTIVE ACTIVITIES AND ADLS
PAIN_FUNCTIONAL_ASSESSMENT: 0-10
PAIN_FUNCTIONAL_ASSESSMENT: PREVENTS OR INTERFERES SOME ACTIVE ACTIVITIES AND ADLS

## 2024-04-15 ASSESSMENT — PAIN DESCRIPTION - PAIN TYPE
TYPE: SURGICAL PAIN

## 2024-04-15 ASSESSMENT — ENCOUNTER SYMPTOMS: SHORTNESS OF BREATH: 0

## 2024-04-15 ASSESSMENT — PAIN SCALES - GENERAL
PAINLEVEL_OUTOF10: 9
PAINLEVEL_OUTOF10: 5
PAINLEVEL_OUTOF10: 9
PAINLEVEL_OUTOF10: 6

## 2024-04-15 ASSESSMENT — PAIN DESCRIPTION - FREQUENCY
FREQUENCY: CONTINUOUS

## 2024-04-15 ASSESSMENT — PAIN DESCRIPTION - ONSET
ONSET: ON-GOING

## 2024-04-15 ASSESSMENT — PAIN DESCRIPTION - DESCRIPTORS
DESCRIPTORS: CRAMPING;SORE
DESCRIPTORS: SORE
DESCRIPTORS: SORE
DESCRIPTORS: DULL;DISCOMFORT
DESCRIPTORS: SORE
DESCRIPTORS: SHARP

## 2024-04-15 ASSESSMENT — LIFESTYLE VARIABLES: SMOKING_STATUS: 0

## 2024-04-15 NOTE — DISCHARGE INSTRUCTIONS
POST-OPERATIVE INSTRUCTIONS   HERNIA SURGERY      DRESSING & INCISION CARE:    ** WASH HANDS IMMEDIATELY BEFORE TOUCHING YOUR DRESSING OR INCISION **    Your incision is closed with:  Skin glue (Dermabond):  This is waterproof, surgical glue, which seals the incision.  All stitches are under the skin and will dissolve with time.  The glue will peel away and fall off over the next 1-2 weeks.  Avoid picking or removing the glue before this time.    If you have a surgical drain in place, please refer to the specific instructions provided about drain care.    You may notice bruising or discoloration around the surgical site, starting 2-3 days after surgery.  This is normal and will fade away with time.      Long-term care of Incision:   A moisturizer (Eucerin, Vit E ointment, etc) can be used on the incision starting 2 weeks after surgery, if it is dry or itchy.    Mederma can occasionally help reduce scarring.    Sun exposure can worsen scarring.  Cover your incision and/or apply sunscreen when exposed to the sun.    SHOWERING / BATHING:  You may shower the day after your surgery.    Wash the area around your incision gently, and then pat dry.  Do not scrub / rub aggressively to avoid disrupting the skin closure (above).  Remember, your coordination will be off for the first couple of days after surgery.  A slippery surface can present a dangerous challenge.   Avoid soaking under water (in a bath tub, hot tub, or pool) until instructed by your surgeon.      ACTIVITY:  Avoid strenuous activity or heavy lifting (greater than 15 lbs.) until discussing with your surgeon at your first post-operative visit.    You may walk around and go up and down stairs in moderation.    Do what is comfortable.  Stop and rest when you feel tired.    IF IT HURTS, DON'T DO IT !!    If you were given an abdominal binder after surgery, please wear it all times (day and night) until your follow up appointment.    This binder can help reduce

## 2024-04-15 NOTE — PROGRESS NOTES
Handoff received from Jailene COSTA. Pt drowsy, yet oriented. Up in chair. Pain level decreasing. Boyfriend at bedside. Pt eating/drinking.

## 2024-04-15 NOTE — PROGRESS NOTES
Verbal and written discharge instructions were given to the patient and family, patient and family verbalize understanding of discharge instructions including medications orders for home and possible side effects associated with them. Patient instructed and verbalizes understanding to call the doctor listed if they should have any complications or worsening symptoms. Verbalizes understanding about follow-up appointments. D/C IV patient tolerated well, no signs of bleeding. Patient discharged home with all belongings. Out via wheelchair.

## 2024-04-15 NOTE — ANESTHESIA POSTPROCEDURE EVALUATION
Department of Anesthesiology  Postprocedure Note    Patient: Roxana Tovar  MRN: [id number]  YOB: 1995  Date of evaluation: 4/15/2024    Procedure Summary       Date: 04/15/24 Room / Location: [address]    Anesthesia Start: 1234 Anesthesia Stop: 1423    Procedure: UMBILICAL HERNIA REPAIR WITH MESH (Abdomen) Diagnosis:       Umbilical hernia without obstruction or gangrene      (Umbilical hernia without obstruction or gangrene [K42.9])    Surgeons: Sharan Thornton MD Responsible Provider: Phong Chacon MD    Anesthesia Type: MAC, General ASA Status: 1            Anesthesia Type: MAC, General    Brody Phase I: Broyd Score: 10    Brody Phase II:      Anesthesia Post Evaluation    Patient location during evaluation: bedside  Patient participation: complete - patient participated  Level of consciousness: awake and alert  Pain score: 3  Nausea & Vomiting: no nausea  Cardiovascular status: hemodynamically stable  Respiratory status: acceptable  Hydration status: stable  Pain management: adequate    No notable events documented.

## 2024-04-15 NOTE — PROGRESS NOTES
Patient awake and alert. Resp easy unlabored on room air O2 with SaO2 96%. Abdominal surgical glue dressing dry and intact. VSS. IV patent. Patient states pain tolerable. Patient denies C/O nausea. Patient wants to see boyfriend. Patient stable to transfer to ACU for phase II.

## 2024-04-15 NOTE — PROGRESS NOTES
Patient opens eyes to name. Resp easy unlabored on room air O2 with SaO2 100%. Moving all extremities to command. VSS. Patient denies C/O pain or nausea. Repositioned for comfort.

## 2024-04-15 NOTE — INTERVAL H&P NOTE
Update History & Physical    The patient's History and Physical of 4/10/2024  was reviewed with the patient and I examined the patient. There was no change. The surgical site was confirmed by the patient and me.     Plan: The risks, benefits, expected outcome, and alternative to the recommended procedure have been discussed with the patient. Patient understands and wants to proceed with the procedure.     Electronically signed by Sharan Thornton MD on 4/15/2024 at 12:11 PM

## 2024-04-15 NOTE — OP NOTE
down through the subcutaneous tissue with electrocautery.  Using blunt dissection, the umbilical stalk was dissected free circumferentially and then dissected from the hernia sac and adjacent fascia using electrocautery.      2 hernia defects were identified.  1 at the level of the umbilical stalk and an additional defect just cephalad to this, with approximately 0.5 cm of intact fascia.  Each defect was approximately 1.5 cm in diameter.  The intervening fascial bridge was divided and the defects were combined.  the hernia contents were reduced.  Any peritoneal attachments to the fascial defect were taken down using blunt dissection and electrocautery.  In this fashion, a pre-peritoneal plane was developed for mesh placement.  The underlying peritoneum was not completely intact, thus it was repaired with a running 3-0 Vicryl suture to create a seamless layer between the mesh and the visceral contents.  The fascial edges were cleared circumferentially.  Additional local anesthetic mixture was infiltrated into the adjacent fascia and soft tissues.  A 8 cm Ventralex ST mesh patch was placed through the hernia defect, into the preperitoneal space.  Care was taken to ensure that the mesh lie flat within the pocket, and there was no folding or rolling of the edges.  The mesh tabs were oriented in the cephalad and caudad directions.  The facial edges were then re-approximated transversely using interrupted figure-of-eight 0 Ethibond sutures.  The mesh tabs were incorporated into the fascial closure.  The umbilicus was tacked down to the fascia with an interrupted suture of 3-0 Vicryl. The incision was closed in two layers with interrupted 3-0 vicryl, and then a running 4-0 Monocryl suture.  Skin glue was applied.  All instrument counts were correct.  The patient tolerated the procedure well and was transferred to the recovery area in stable condition.       FINDING(S): Incarcerated umbilical hernia, two defects (1.5 cm

## 2024-04-15 NOTE — ANESTHESIA PRE PROCEDURE
and allergy history).  No interval changes to history and physical examination.  Anesthetic plan, risks, benefits, alternatives, and personnel involved discussed with patient.  Patient verbalized an understanding and agrees to proceed.      Phong Chacon MD  April 15, 2024  11:50 AM

## 2024-04-15 NOTE — PROGRESS NOTES
Pt to Phase 2 from PACU, Pt is alert and oriented to speech. Pt is tearful and states that pain is a 9/10, Pt medicated per MAR. Pt assisted up to chair, vital signs stable on room air.

## 2024-04-15 NOTE — PROGRESS NOTES
Patient admitted to PACU from OR. Patient asleep with oral airway intact. Resp easy unlabored on 3L NC with SaO2 100%. Abdominal surgical glue dressing dry and intact with ice pack on. IV patent to right AC. VSS.

## 2024-04-16 ENCOUNTER — TELEPHONE (OUTPATIENT)
Dept: SURGERY | Age: 29
End: 2024-04-16

## 2024-04-16 NOTE — TELEPHONE ENCOUNTER
Mom called in on behalf of pt being in a lot of pain, I recommended she try to stay on top of the pain, she should take the narcotic, 4 hours later take ibuprofen, 4 hours later take tylenol and stay on this rotation and ice periodically. Mom has been doing this, I suggested she continue doing this and each day the pain will get better.

## 2024-04-17 ENCOUNTER — OFFICE VISIT (OUTPATIENT)
Dept: SURGERY | Age: 29
End: 2024-04-17

## 2024-04-17 ENCOUNTER — TELEPHONE (OUTPATIENT)
Dept: SURGERY | Age: 29
End: 2024-04-17

## 2024-04-17 VITALS — WEIGHT: 160 LBS | BODY MASS INDEX: 24.33 KG/M2

## 2024-04-17 DIAGNOSIS — G89.18 POST-OP PAIN: Primary | ICD-10-CM

## 2024-04-17 DIAGNOSIS — K42.9 UMBILICAL HERNIA WITHOUT OBSTRUCTION AND WITHOUT GANGRENE: ICD-10-CM

## 2024-04-17 PROCEDURE — 99024 POSTOP FOLLOW-UP VISIT: CPT | Performed by: STUDENT IN AN ORGANIZED HEALTH CARE EDUCATION/TRAINING PROGRAM

## 2024-04-17 RX ORDER — METHOCARBAMOL 500 MG/1
500 TABLET, FILM COATED ORAL 3 TIMES DAILY
Qty: 30 TABLET | Refills: 0 | Status: SHIPPED | OUTPATIENT
Start: 2024-04-17 | End: 2024-04-27

## 2024-04-17 RX ORDER — ACETAMINOPHEN 500 MG
1000 TABLET ORAL EVERY 8 HOURS
Refills: 0 | COMMUNITY
Start: 2024-04-17 | End: 2024-04-22

## 2024-04-17 RX ORDER — IBUPROFEN 200 MG
400 TABLET ORAL EVERY 8 HOURS
Refills: 0 | COMMUNITY
Start: 2024-04-17 | End: 2024-04-22

## 2024-04-17 RX ORDER — OXYCODONE HYDROCHLORIDE 5 MG/1
5 TABLET ORAL EVERY 6 HOURS PRN
Qty: 20 TABLET | Refills: 0 | Status: SHIPPED | OUTPATIENT
Start: 2024-04-17 | End: 2024-04-22

## 2024-04-17 NOTE — PATIENT INSTRUCTIONS
medication  Itching, rash, or hives  Inability to urinate for greater than 6 hours  Any other issues that you or your caretakers are concerned about      FOLLOW-UP CARE / POST-OPERATIVE VISIT:  @FUP@    Please call our office to make an appointment with your doctor, if it has not already been scheduled.  [phone]

## 2024-04-17 NOTE — PROGRESS NOTES
GENERAL SURGERY  Post-Op Office Visit    Patient Name: Roxana Tovar  MRN: [id number]  YOB: 1995   Date of Service: 2024      Chief Complaint   Patient presents with    Post-Op Check     S/p Umbilical hernia repair with mesh, 4/15. Pt is having a lot of pain, having a lot of swelling at incision site.        SUBJECTIVE:     Roxana presents for follow up visit after undergoing open umbilical hernia repair with mesh on 4/15.    Pain: yes: Severe  Incision(s): healing well  Nausea/Vomiting: yes: Nauseous, but not yet vomited  Fever/Chills:  no  Bowel function: Decreased, she has not had a bowel movement since surgery  Activity: Decreased      REVIEW OF SYSTEMS  A comprehensive review of systems was completed and is negative except for any elements noted above.    Past Medical History:   Diagnosis Date    Anemia     Headache(784.0)      Past Surgical History:   Procedure Laterality Date    INDUCED   2021    TONSILLECTOMY      UMBILICAL HERNIA REPAIR N/A 4/15/2024    UMBILICAL HERNIA REPAIR WITH MESH performed by Shaarn Thornton MD at [facility] OR     Prior to Admission medications    Medication Sig Start Date End Date Taking? Authorizing Provider   acetaminophen (TYLENOL) 500 MG tablet Take 2 tablets by mouth in the morning and 2 tablets at noon and 2 tablets in the evening. Do all this for 5 days. 24 Yes Sharan Thornton MD   ibuprofen (ADVIL) 200 MG tablet Take 2 tablets by mouth every 8 (eight) hours for 5 days 24 Yes Sharan Thornton MD   oxyCODONE (ROXICODONE) 5 MG immediate release tablet Take 1 tablet by mouth every 6 hours as needed for Pain for up to 5 days. Intended supply: 3 days. Take lowest dose possible to manage pain Max Daily Amount: 20 mg 24 Yes Sharan Thornton MD   methocarbamol (ROBAXIN) 500 MG tablet Take 1 tablet by mouth 3 times daily for 10 days 24 Yes Sharan Thornton MD   ibuprofen (ADVIL) 200 MG tablet

## 2025-08-12 ENCOUNTER — HOSPITAL ENCOUNTER (EMERGENCY)
Age: 30
Discharge: HOME | End: 2025-08-12
Payer: OTHER MISCELLANEOUS

## 2025-08-12 VITALS
SYSTOLIC BLOOD PRESSURE: 105 MMHG | HEART RATE: 84 BPM | DIASTOLIC BLOOD PRESSURE: 72 MMHG | TEMPERATURE: 98.4 F | RESPIRATION RATE: 20 BRPM | WEIGHT: 163.9 LBS | HEIGHT: 68 IN | BODY MASS INDEX: 24.84 KG/M2 | OXYGEN SATURATION: 97 %

## 2025-08-12 DIAGNOSIS — S09.90XA CLOSED HEAD INJURY, INITIAL ENCOUNTER: Primary | ICD-10-CM
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
## 2025-08-12 PROCEDURE — 99282 EMERGENCY DEPT VISIT SF MDM: CPT

## 2025-08-12 ASSESSMENT — PAIN DESCRIPTION - FREQUENCY: FREQUENCY: CONTINUOUS

## 2025-08-12 ASSESSMENT — PAIN DESCRIPTION - DESCRIPTORS: DESCRIPTORS: ACHING

## 2025-08-12 ASSESSMENT — PAIN - FUNCTIONAL ASSESSMENT: PAIN_FUNCTIONAL_ASSESSMENT: 0-10

## 2025-08-12 ASSESSMENT — PAIN SCALES - GENERAL: PAINLEVEL_OUTOF10: 6

## 2025-08-12 ASSESSMENT — PAIN DESCRIPTION - ORIENTATION: ORIENTATION: RIGHT

## 2025-08-12 ASSESSMENT — PAIN DESCRIPTION - LOCATION: LOCATION: HEAD;HIP

## 2025-08-12 ASSESSMENT — PAIN DESCRIPTION - PAIN TYPE: TYPE: ACUTE PAIN

## (undated) DEVICE — SUTURE MONOCRYL + SZ 4-0 L18IN ABSRB UD L19MM PS-2 3/8 CIR MCP496G

## (undated) DEVICE — LIQUIBAND RAPID ADHESIVE 36/CS 0.8ML: Brand: MEDLINE

## (undated) DEVICE — SUTURE VICRYL + SZ 3-0 L27IN ABSRB UD L26MM SH 1/2 CIR VCP416H

## (undated) DEVICE — MINOR SET UP: Brand: MEDLINE INDUSTRIES, INC.

## (undated) DEVICE — SPONGE LAP W18XL18IN WHT COT 4 PLY FLD STRUNG RADPQ DISP ST 2 PER PACK

## (undated) DEVICE — GOWN SIRUS NONREIN LG W/TWL: Brand: MEDLINE INDUSTRIES, INC.

## (undated) DEVICE — GLOVE ORANGE PI 7   MSG9070

## (undated) DEVICE — DRESSING TRNSP TEGADERM 1ST AID STYL 4X4.75IN

## (undated) DEVICE — TUBING, SUCTION, 1/4" X 12', STRAIGHT: Brand: MEDLINE

## (undated) DEVICE — GLOVE ORANGE PI 7 1/2   MSG9075

## (undated) DEVICE — SYRINGE IRRIG 60ML SFT PLIABLE BLB EZ TO GRP 1 HND USE W/

## (undated) DEVICE — SUTURE ABSORBABLE L 18 IN SZ 4-0 NDL L 19 MM POLYGLACTIN 910 36/BX

## (undated) DEVICE — SUTURE VICRYL + SZ 2-0 L27IN ABSRB CLR CT-1 1/2 CIR TAPERCUT VCP259H

## (undated) DEVICE — MERCY HEALTH WEST TURNOVER: Brand: MEDLINE INDUSTRIES, INC.

## (undated) DEVICE — BALL COT M DIA1IN WHT

## (undated) DEVICE — DRAPE,MINOR PROC,6X6 FEN, STER: Brand: MEDLINE